# Patient Record
Sex: FEMALE | Race: BLACK OR AFRICAN AMERICAN | Employment: FULL TIME | URBAN - METROPOLITAN AREA
[De-identification: names, ages, dates, MRNs, and addresses within clinical notes are randomized per-mention and may not be internally consistent; named-entity substitution may affect disease eponyms.]

---

## 2017-10-18 ENCOUNTER — HOSPITAL ENCOUNTER (OUTPATIENT)
Dept: LAB | Age: 42
Discharge: HOME | End: 2017-10-18

## 2019-02-21 ENCOUNTER — OFFICE VISIT (OUTPATIENT)
Dept: FAMILY MEDICINE CLINIC | Age: 44
End: 2019-02-21

## 2019-02-21 VITALS
HEART RATE: 83 BPM | RESPIRATION RATE: 18 BRPM | OXYGEN SATURATION: 99 % | TEMPERATURE: 98.5 F | BODY MASS INDEX: 35.2 KG/M2 | DIASTOLIC BLOOD PRESSURE: 96 MMHG | SYSTOLIC BLOOD PRESSURE: 160 MMHG | HEIGHT: 64 IN | WEIGHT: 206.2 LBS

## 2019-02-21 DIAGNOSIS — R63.5 WEIGHT GAIN: ICD-10-CM

## 2019-02-21 DIAGNOSIS — Z23 ENCOUNTER FOR IMMUNIZATION: Primary | ICD-10-CM

## 2019-02-21 DIAGNOSIS — E66.01 SEVERE OBESITY (HCC): ICD-10-CM

## 2019-02-21 DIAGNOSIS — R79.89 ABNORMAL THYROID BLOOD TEST: ICD-10-CM

## 2019-02-21 DIAGNOSIS — Z13.220 LIPID SCREENING: ICD-10-CM

## 2019-02-21 DIAGNOSIS — Z00.00 ENCOUNTER FOR PREVENTIVE CARE: ICD-10-CM

## 2019-02-21 PROBLEM — E55.9 VITAMIN D INSUFFICIENCY: Status: ACTIVE | Noted: 2019-02-21

## 2019-02-21 RX ORDER — MELOXICAM 15 MG/1
TABLET ORAL
Refills: 2 | COMMUNITY
Start: 2019-01-17 | End: 2020-01-07

## 2019-02-21 NOTE — PROGRESS NOTES
[address] [place], [address] 
[phone] Date of visit:  2/21/19 Subjective:  
  
History obtained from:  the patient. Alem Chen is a 41 y.o. female who presents today for new patient to me, used to see Dr. Prince Mention here but has been almost 3 years since she had a physical or labs Mostly concerned with getting healthier, losing weight Does praise dance on Tuesday and Saturdays Planning to get back in the gym as she has gained some weight, plans to walk on treadmill and some lifting, maybe back in the pool Plans to go the [facility] Has always been active Wears a fitbit and gets 10k steps every day at least 
 
Says her eating habits have been bad recently Has gotten into eating a lot of fast food because she is on the go Like getting Florrie Goo or chikfila in the AM 
Previously would do 2 boiled eggs and a piece of toast 
 
Normally brings her lunch, whatever she cooked the night before Has been drinking soda, some juice, going to cut back slowly Sometimes coffee but only likes it with lots of sugar Does drink water too Likes to eat salad, salmon, spaghetti (tried brown noodles the other night) Sometimes will bhatia chicken, lately baking it Not a lot of beef, usually turkey if wanting ground meat Normally chicken or fish Has broccoli, spring beans, corn, collards Trying to cut back sodium as well, bought Mrs. Cardoso Man recently Works as special  Also works at [employer] on weekends Busy taking her 3 teenagers to sports Had a tetanus shot for an injury last year, cut her finger Willing to get flu shot today Youngest son has the flu now Having some hot/cold feelings at night Still having monthly cycles Itchiness in mid-chest sometimes when she praise dances but no cough or sob or pain, just a deep itchiness Patient Active Problem List  
 Diagnosis Date Noted  Vitamin D insufficiency 02/21/2019 Current Outpatient Medications Medication Sig Dispense Refill  meloxicam (MOBIC) 15 mg tablet TAKE 1 TABLET BY MOUTH EVERY DAY  2 No Known Allergies Past Medical History:  
Diagnosis Date  Abnormal Pap smear of cervix 2013 No past surgical history on file. Family History Problem Relation Age of Onset  Hypertension Mother  HIV/AIDS Mother  Hypertension Maternal Aunt  Hypertension Paternal Aunt  Cancer Paternal Aunt  Cancer Paternal Uncle  Kidney Disease Maternal Grandmother  Hypertension Father Social History Tobacco Use  Smoking status: Never Smoker  Smokeless tobacco: Never Used Substance Use Topics  Alcohol use: No  
  
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 Romain's Review of Systems Card: denies chest pain or palpitations Pulm: denies shortness of breath 3 most recent PHQ Screens 2/21/2019 Little interest or pleasure in doing things Not at all Feeling down, depressed, irritable, or hopeless Not at all Total Score PHQ 2 0 Objective:  
 
Vitals:  
 02/21/19 1558 BP: (!) 160/96 Pulse: 83 Resp: 18 Temp: 98.5 °F (36.9 °C) TempSrc: Oral  
SpO2: 99% Weight: 206 lb 3.2 oz (93.5 kg) Height: 5' 4\" (1.626 m) Body mass index is 35.39 kg/m². General: stated age,obese, and in NAD Eyes: PERRL, EOMI, no redness or drainage Nose: no drainage Mouth: no lesions Throat: no erythema, exudate or swelling Neck: supple, symmetrical, trachea midline, no adenopathy and thyroid: not enlarged, symmetric, no tenderness/mass/nodules Lungs:  clear to auscultation w/o rales, rhonchi, wheezes w/normal effort and no use of accessory muscles of respiration Heart: regular rate and rhythm, S1, S2 normal, no murmur, click, rub or gallop Abdomen: soft, nontender, no masses Ext:  No edema noted. Lymph: no cervical adenopathy appreciated Skin:  Normal. and no rash or abnormalities Neuro: normal gait, CN 2-12 intact Psych: alert and oriented to person, place, time and situation and Speech: appropriate quality, quantity and organization of sentences and normal affect Assessment/Plan: ICD-10-CM ICD-9-CM 1. Encounter for immunization Z23 V03.89 LA IMMUNIZ ADMIN,1 SINGLE/COMB VAC/TOXOID INFLUENZA VIRUS VAC QUAD,SPLIT,PRESV FREE SYRINGE IM 2. Encounter for preventive care Z00.00 V70.0 CBC WITH AUTOMATED DIFF  
   METABOLIC PANEL, COMPREHENSIVE  
   HEMOGLOBIN A1C WITH EAG  
   LIPID PANEL  
   T4, FREE  
   TSH 3RD GENERATION 3. Abnormal thyroid blood test R79.89 790.6 T4, FREE  
   TSH 3RD GENERATION 4. Weight gain R63.5 783.1 CBC WITH AUTOMATED DIFF  
   METABOLIC PANEL, COMPREHENSIVE T4, FREE  
   TSH 3RD GENERATION 5. Lipid screening Z13.220 V77.91 LIPID PANEL 6. Severe obesity (HCC) E66.01 278.01 HEMOGLOBIN A1C WITH EAG Orders Placed This Encounter  Influenza virus vaccine (QUADRIVALENT PRES FREE SYRINGE) IM ([id number])  CBC WITH AUTOMATED DIFF  
 METABOLIC PANEL, COMPREHENSIVE  
 HEMOGLOBIN A1C WITH EAG  
 LIPID PANEL  
 T4, FREE  
 TSH 3RD GENERATION  
 CVD REPORT  CKD REPORT  
 LA IMMUNIZ ADMIN,1 SINGLE/COMB VAC/TOXOID  meloxicam (MOBIC) 15 mg tablet Mostly discussed lifestyle, has a good plan for diet/exercise I think will work for her Routine labs Flu shot; other preventive care up to date (I think she said she has gyn for pap but need to clarify with her) No history of HTN but high today; she will monitor at home and update me in 1-2 weeks. Discussed that the lower salt diet and execise should help that Patient Instructions I think you have a great plan for your healthy eating and exercise The breakfast and soda are the main things to change Continue to eat a lot of veggies and drink a lot of water Try claritin about 1 hour before praise dancing and see if it helps the itching Your blood pressure should be less than 140/90 consistently (more than 90% of the time). Check it after sitting and resting for 10 minutes for an accurate result. Please call me if it is often running high. Try to check it daily and update me in 1-2 weeks Discussed the diagnosis and plan and she expressed understanding. Follow-up Disposition: 
Return in about 1 year (around 2/21/2020).  
 
Baldemar Roa MD

## 2019-02-21 NOTE — PATIENT INSTRUCTIONS
I think you have a great plan for your healthy eating and exercise The breakfast and soda are the main things to change Continue to eat a lot of veggies and drink a lot of water Try claritin about 1 hour before praise dancing and see if it helps the itching Your blood pressure should be less than 140/90 consistently (more than 90% of the time). Check it after sitting and resting for 10 minutes for an accurate result. Please call me if it is often running high. Try to check it daily and update me in 1-2 weeks Well Visit, Ages 25 to 48: Care Instructions Your Care Instructions Physical exams can help you stay healthy. Your doctor has checked your overall health and may have suggested ways to take good care of yourself. He or she also may have recommended tests. At home, you can help prevent illness with healthy eating, regular exercise, and other steps. Follow-up care is a key part of your treatment and safety. Be sure to make and go to all appointments, and call your doctor if you are having problems. It's also a good idea to know your test results and keep a list of the medicines you take. How can you care for yourself at home? · Reach and stay at a healthy weight. This will lower your risk for many problems, such as obesity, diabetes, heart disease, and high blood pressure. · Get at least 30 minutes of physical activity on most days of the week. Walking is a good choice. You also may want to do other activities, such as running, swimming, cycling, or playing tennis or team sports. Discuss any changes in your exercise program with your doctor. · Do not smoke or allow others to smoke around you. If you need help quitting, talk to your doctor about stop-smoking programs and medicines. These can increase your chances of quitting for good. · Talk to your doctor about whether you have any risk factors for sexually transmitted infections (STIs).  Having one sex partner (who does not have STIs and does not have sex with anyone else) is a good way to avoid these infections. · Use birth control if you do not want to have children at this time. Talk with your doctor about the choices available and what might be best for you. · Protect your skin from too much sun. When you're outdoors from 10 a.m. to 4 p.m., stay in the shade or cover up with clothing and a hat with a wide brim. Wear sunglasses that block UV rays. Even when it's cloudy, put broad-spectrum sunscreen (SPF 30 or higher) on any exposed skin. · See a dentist one or two times a year for checkups and to have your teeth cleaned. · Wear a seat belt in the car. · Drink alcohol in moderation, if at all. That means no more than 2 drinks a day for men and 1 drink a day for women. Follow your doctor's advice about when to have certain tests. These tests can spot problems early. For everyone · Cholesterol. Have the fat (cholesterol) in your blood tested after age 21. Your doctor will tell you how often to have this done based on your age, family history, or other things that can increase your risk for heart disease. · Blood pressure. Have your blood pressure checked during a routine doctor visit. Your doctor will tell you how often to check your blood pressure based on your age, your blood pressure results, and other factors. · Vision. Talk with your doctor about how often to have a glaucoma test. 
· Diabetes. Ask your doctor whether you should have tests for diabetes. · Colon cancer. Have a test for colon cancer at age 48. You may have one of several tests. If you are younger than 48, you may need a test earlier if you have any risk factors. Risk factors include whether you already had a precancerous polyp removed from your colon or whether your parent, brother, sister, or child has had colon cancer. For women · Breast exam and mammogram. Talk to your doctor about when you should have a clinical breast exam and a mammogram. Medical experts differ on whether and how often women under 50 should have these tests. Your doctor can help you decide what is right for you. · Pap test and pelvic exam. Begin Pap tests at age 24. A Pap test is the best way to find cervical cancer. The test often is part of a pelvic exam. Ask how often to have this test. 
· Tests for sexually transmitted infections (STIs). Ask whether you should have tests for STIs. You may be at risk if you have sex with more than one person, especially if your partners do not wear condoms. For men · Tests for sexually transmitted infections (STIs). Ask whether you should have tests for STIs. You may be at risk if you have sex with more than one person, especially if you do not wear a condom. · Testicular cancer exam. Ask your doctor whether you should check your testicles regularly. · Prostate exam. Talk to your doctor about whether you should have a blood test (called a PSA test) for prostate cancer. Experts differ on whether and when men should have this test. Some experts suggest it if you are older than 39 and are -American or have a father or brother who got prostate cancer when he was younger than 72. When should you call for help? Watch closely for changes in your health, and be sure to contact your doctor if you have any problems or symptoms that concern you. Where can you learn more? Go to http://sandor-raghu.info/. Enter P072 in the search box to learn more about \"Well Visit, Ages 25 to 48: Care Instructions. \" Current as of: March 28, 2018 Content Version: 11.9 © 1952-3855 [employer]wise, Incorporated. Care instructions adapted under license by [employer] (which disclaims liability or warranty for this information).  If you have questions about a medical condition or this instruction, always ask your healthcare professional. [employer] disclaims any warranty or liability for your use of this information.

## 2019-02-21 NOTE — PROGRESS NOTES
Chief Complaint Patient presents with  New Patient  
  establish care former stuart patient 1. Have you been to the ER, urgent care clinic since your last visit? Hospitalized since your last visit? No 
 
2. Have you seen or consulted any other health care providers outside of the [address] [place] [address] since your last visit? Include any pap smears or colon screening Breast surgery [facility]. 2 years ago, uterine surgery [facility]. 1 year at [facility] Patient has had pap within a year Patient sees PT

## 2019-02-22 PROBLEM — E66.01 SEVERE OBESITY (HCC): Status: ACTIVE | Noted: 2019-02-22

## 2019-02-22 PROBLEM — R79.89 ELEVATED SERUM CREATININE: Status: ACTIVE | Noted: 2019-02-22

## 2019-02-22 LAB
ALBUMIN SERPL-MCNC: 3.8 G/DL (ref 3.5–5.5)
ALBUMIN/GLOB SERPL: 1.3 {RATIO} (ref 1.2–2.2)
ALP SERPL-CCNC: 107 IU/L (ref 39–117)
ALT SERPL-CCNC: 13 IU/L (ref 0–32)
AST SERPL-CCNC: 17 IU/L (ref 0–40)
BASOPHILS # BLD AUTO: 0.1 X10E3/UL (ref 0–0.2)
BASOPHILS NFR BLD AUTO: 1 %
BILIRUB SERPL-MCNC: <0.2 MG/DL (ref 0–1.2)
BUN SERPL-MCNC: 15 MG/DL (ref 6–24)
BUN/CREAT SERPL: 12 (ref 9–23)
CALCIUM SERPL-MCNC: 9.1 MG/DL (ref 8.7–10.2)
CHLORIDE SERPL-SCNC: 105 MMOL/L (ref 96–106)
CHOLEST SERPL-MCNC: 194 MG/DL (ref 100–199)
CO2 SERPL-SCNC: 26 MMOL/L (ref 20–29)
CREAT SERPL-MCNC: 1.21 MG/DL (ref 0.57–1)
EOSINOPHIL # BLD AUTO: 0.4 X10E3/UL (ref 0–0.4)
EOSINOPHIL NFR BLD AUTO: 3 %
ERYTHROCYTE [DISTWIDTH] IN BLOOD BY AUTOMATED COUNT: 14.4 % (ref 12.3–15.4)
EST. AVERAGE GLUCOSE BLD GHB EST-MCNC: 114 MG/DL
GLOBULIN SER CALC-MCNC: 2.9 G/DL (ref 1.5–4.5)
GLUCOSE SERPL-MCNC: 79 MG/DL (ref 65–99)
HBA1C MFR BLD: 5.6 % (ref 4.8–5.6)
HCT VFR BLD AUTO: 39.2 % (ref 34–46.6)
HDLC SERPL-MCNC: 44 MG/DL
HGB BLD-MCNC: 12.6 G/DL (ref 11.1–15.9)
IMM GRANULOCYTES # BLD AUTO: 0 X10E3/UL (ref 0–0.1)
IMM GRANULOCYTES NFR BLD AUTO: 0 %
INTERPRETATION, 910389: NORMAL
INTERPRETATION: NORMAL
LDLC SERPL CALC-MCNC: 125 MG/DL (ref 0–99)
LYMPHOCYTES # BLD AUTO: 2.9 X10E3/UL (ref 0.7–3.1)
LYMPHOCYTES NFR BLD AUTO: 26 %
MCH RBC QN AUTO: 27.6 PG (ref 26.6–33)
MCHC RBC AUTO-ENTMCNC: 32.1 G/DL (ref 31.5–35.7)
MCV RBC AUTO: 86 FL (ref 79–97)
MONOCYTES # BLD AUTO: 1 X10E3/UL (ref 0.1–0.9)
MONOCYTES NFR BLD AUTO: 9 %
NEUTROPHILS # BLD AUTO: 7.1 X10E3/UL (ref 1.4–7)
NEUTROPHILS NFR BLD AUTO: 61 %
PDF IMAGE, 910387: NORMAL
PLATELET # BLD AUTO: 305 X10E3/UL (ref 150–379)
POTASSIUM SERPL-SCNC: 4.3 MMOL/L (ref 3.5–5.2)
PROT SERPL-MCNC: 6.7 G/DL (ref 6–8.5)
RBC # BLD AUTO: 4.56 X10E6/UL (ref 3.77–5.28)
SODIUM SERPL-SCNC: 142 MMOL/L (ref 134–144)
T4 FREE SERPL-MCNC: 1.04 NG/DL (ref 0.82–1.77)
TRIGL SERPL-MCNC: 126 MG/DL (ref 0–149)
TSH SERPL DL<=0.005 MIU/L-ACNC: 1.08 UIU/ML (ref 0.45–4.5)
VLDLC SERPL CALC-MCNC: 25 MG/DL (ref 5–40)
WBC # BLD AUTO: 11.5 X10E3/UL (ref 3.4–10.8)

## 2019-02-22 NOTE — PROGRESS NOTES
Sent in letter: 
Thyroid tests were normal this time. Blood counts, sugar, liver, and electrolytes were fine. Cholesterol was just slightly high but ok for your age. Your diet plan should help that anyway. Kidney test (creatinine) was just slightly elevated. It is probably nothing, but I do want to make sure we are protecting your kidneys. The meloxicam you have, in addition to other nsaids (like aleve, ibuprofen, or aspirin) can hurt your kidneys. Please let me know if you are taking any of those on a regular basis. More than 2x per month is probably not good. Tylenol would be a very safe substitute. Also, I am not sure if you have a gyn doctor? Let me know if you need me to do your pap. If you do have a gyn right now, could you ask them to send me a record of your most recent visit?    
 
It was great to meet you, and I look forward to seeing your progress!!

## 2019-11-25 ENCOUNTER — OFFICE VISIT (OUTPATIENT)
Dept: FAMILY MEDICINE CLINIC | Age: 44
End: 2019-11-25

## 2019-11-25 VITALS
TEMPERATURE: 99.3 F | OXYGEN SATURATION: 99 % | RESPIRATION RATE: 18 BRPM | SYSTOLIC BLOOD PRESSURE: 150 MMHG | WEIGHT: 212 LBS | HEIGHT: 64 IN | DIASTOLIC BLOOD PRESSURE: 100 MMHG | HEART RATE: 88 BPM | BODY MASS INDEX: 36.19 KG/M2

## 2019-11-25 DIAGNOSIS — J06.9 UPPER RESPIRATORY TRACT INFECTION, UNSPECIFIED TYPE: Primary | ICD-10-CM

## 2019-11-25 DIAGNOSIS — R03.0 ELEVATED BLOOD PRESSURE READING: ICD-10-CM

## 2019-11-25 RX ORDER — AZITHROMYCIN 250 MG/1
TABLET, FILM COATED ORAL
Qty: 6 TAB | Refills: 0 | Status: SHIPPED | OUTPATIENT
Start: 2019-11-25 | End: 2019-11-30

## 2019-11-25 RX ORDER — NORETHINDRONE ACETATE AND ETHINYL ESTRADIOL AND FERROUS FUMARATE 1MG-20(21)
KIT ORAL
Refills: 5 | COMMUNITY
Start: 2019-11-07 | End: 2020-06-16

## 2019-11-25 NOTE — PROGRESS NOTES
HISTORY OF PRESENT ILLNESS  Danae Marshall is a 45 y.o. female. HPI  C/o cough, chest congestion, malaise x 1 week. No fever/chills. Taking coricidin with little sx relief. BP noted to be elevated today in office. Patient reports it is up and down. She has CPE scheduled with PCP in 8 weeks. Past medical history, social history, family history and medications were reviewed and updated. Blood pressure (!) 150/100, pulse 88, temperature 99.3 °F (37.4 °C), temperature source Oral, resp. rate 18, height 5' 4\" (1.626 m), weight 212 lb (96.2 kg), last menstrual period 11/18/2019, SpO2 99 %. Review of Systems   Constitutional: Positive for malaise/fatigue. Negative for chills and fever. HENT: Negative for congestion, sinus pain and sore throat. Respiratory: Positive for cough. Negative for sputum production, shortness of breath and wheezing. Cardiovascular: Negative for chest pain. Neurological: Negative for dizziness and headaches. All other systems reviewed and are negative. Physical Exam  Constitutional:       General: She is not in acute distress. HENT:      Right Ear: Tympanic membrane and ear canal normal.      Left Ear: Tympanic membrane and ear canal normal.      Nose: Congestion present. Mouth/Throat:      Pharynx: Posterior oropharyngeal erythema present. No oropharyngeal exudate. Neck:      Musculoskeletal: Neck supple. Cardiovascular:      Rate and Rhythm: Normal rate and regular rhythm. Heart sounds: Normal heart sounds. Pulmonary:      Effort: Pulmonary effort is normal.      Breath sounds: Normal breath sounds. No wheezing. Chest:      Chest wall: No tenderness. Lymphadenopathy:      Cervical: No cervical adenopathy. Skin:     General: Skin is warm and dry. ASSESSMENT and PLAN  Diagnoses and all orders for this visit:    1. Upper respiratory tract infection, unspecified type  -     azithromycin (ZITHROMAX) 250 mg tablet;  Take 2 tablets today, then take 1 tablet daily  Rest and fluids. Mucinex DM bid. May take nyquil cough at bedtime. Begin z pack as directed. Medication risks, benefit and potential side effects were reviewed. 2. Elevated blood pressure reading  Recommend interim BP monitoring and record. Follow up 2 weeks to recheck.

## 2019-11-25 NOTE — LETTER
NOTIFICATION RETURN TO WORK / SCHOOL 
 
11/25/2019 6:56 PM 
 
[facility] [address] [facility] [phone] To Whom It May Concern: 
 
Elena Kim is currently under the care of LEYDI Gilbert. She will return to work/school on: 12/2/19. If there are questions or concerns please have the patient contact our office.  
 
 
 
Sincerely, 
 
 
Saloni Bowden NP

## 2019-11-25 NOTE — PROGRESS NOTES
Chief Complaint   Patient presents with    Nasal Congestion     x 1 week    Cough     x 1 week productive cough     Sore Throat     x 1 week     Fatigue     x 1 week with headache      1. Have you been to the ER, urgent care clinic since your last visit? Hospitalized since your last visit? No    2. Have you seen or consulted any other health care providers outside of the [address] [place] [address] since your last visit? Include any pap smears or colon screening.  No

## 2019-11-26 ENCOUNTER — TELEPHONE (OUTPATIENT)
Dept: FAMILY MEDICINE CLINIC | Age: 44
End: 2019-11-26

## 2019-11-26 DIAGNOSIS — I10 ESSENTIAL HYPERTENSION: Primary | ICD-10-CM

## 2019-11-26 RX ORDER — LOSARTAN POTASSIUM 50 MG/1
50 TABLET ORAL DAILY
Qty: 30 TAB | Refills: 0 | Status: SHIPPED | OUTPATIENT
Start: 2019-11-26 | End: 2019-12-18

## 2019-11-26 NOTE — TELEPHONE ENCOUNTER
Pt is calling to let Ahmet know that her BP is still very high  Its 164/101a few minutes ago. She doesn't want to come for an appointment , states that [facility] asked her to call if her BP was still high  She doesn't have any symptoms. Had slight headache but she took Asprin.     Requesting call back BCB# [phone]  LOV : November 25, 2019 06:15 PM

## 2019-11-26 NOTE — TELEPHONE ENCOUNTER
Jose Singleton, ONELIA Velazquez, LPN   Caller: Unspecified (Today,  3:36 PM)             I will send rx for medication to pharmacy. Paul Brown in 2 weeks to recheck.       Left message that rx has been sent in and needs to schedule appt in 2 weeks

## 2019-12-18 DIAGNOSIS — I10 ESSENTIAL HYPERTENSION: ICD-10-CM

## 2019-12-18 RX ORDER — LOSARTAN POTASSIUM 50 MG/1
TABLET ORAL
Qty: 30 TAB | Refills: 0 | Status: SHIPPED | OUTPATIENT
Start: 2019-12-18 | End: 2020-01-07 | Stop reason: SDUPTHER

## 2020-01-06 PROBLEM — I10 ESSENTIAL HYPERTENSION: Status: ACTIVE | Noted: 2020-01-06

## 2020-01-06 PROBLEM — E78.00 PURE HYPERCHOLESTEROLEMIA: Status: ACTIVE | Noted: 2020-01-06

## 2020-01-06 NOTE — PROGRESS NOTES
Lucas Hoyt [facility]  [place]. [place], [address]  [phone]             Date of visit:  1/7/2020      Subjective:      History obtained from:  the patient. Lilly Ornelas is a 45 y.o. female who presents today for physical    Feeling well overall  Had gotten sick in November, her bp was high  Ended up taking antibiotics for bronchitis  bp continued to stay high  Called back in and put her on the losartan and has come down  Blood pressure on losartan mostly controlled at home and school, like 120s    Has gyn, last pap was Nov and was ok  On OCPs for headaches since November  Has had tubes tied so doesn't   Need it for pregnancy prevention    Has been drinking more water  Also avoiding sodium foods  Has lost a little weight    Does praise dance on Tuesday and Saturdays still  Planning to get back in the [facility] as she has gained some weight, plans to walk on treadmill  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 Was also drinking some soda and juice but mostly stopped that.     Flu shot not yet, we are out of it so will get it at pharmacy    Every once in a while will have upper central chest pain for about 2 days straight  Thinks it is related to sleeping funny  Also thinks maybe related to something she eats or due to needing to drink more water  Maybe worse when she lies down, depends on the position she lays in  Doesn't make her short of breath  Does feel worse if she pushes on it, or if she moves a certain way  Might happen once per month    Did mamm[facility] at [facility] in October      Patient Active Problem List    Diagnosis Date Noted    Essential hypertension 01/06/2020    Pure hypercholesterolemia 01/06/2020    Elevated serum creatinine 02/22/2019    Severe obesity ([address] 02/22/2019    Vitamin D insufficiency 02/21/2019 Current Outpatient Medications   Medication Sig Dispense Refill    losartan (COZAAR) 50 mg tablet TAKE 1 TABLET BY MOUTH EVERY DAY 90 Tab 1    JUNEL FE 1/20, 28, 1 mg-20 mcg (21)/75 mg (7) tab TAKE 1 TABLET BY MOUTH EVERY DAY  5     No Known Allergies  Past Medical History:   Diagnosis Date    Abnormal Pap smear of cervix 2013     History reviewed. No pertinent surgical history. Family History   Problem Relation Age of Onset    Hypertension Mother     HIV/AIDS Mother     Hypertension Maternal Aunt     Hypertension Paternal Aunt     Cancer Paternal Aunt     Cancer Paternal Uncle     Kidney Disease Maternal Grandmother     Hypertension Father      Social History     Tobacco Use    Smoking status: Never Smoker    Smokeless tobacco: Never Used   Substance Use Topics    Alcohol use: No      Social History     Patient does not qualify to have social determinant information on file (likely too young). 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 , also works at [employer] with her [employer], [address] taking her 3 teenagers to sports         Review of Systems  Pulm: denies sob  3 most recent [address] 1/7/2020   [address] interest or pleasure in doing things Not at all   Feeling down, depressed, irritable, or hopeless Not at all   Total Score PHQ 2 0       Objective:     Vitals:    01/07/20 0843 01/07/20 0924   BP: (!) 145/96 130/78   Pulse: 77    Resp: 18    Temp: 98.2 °F (36.8 °C)    TempSrc: Oral    SpO2: 100%    Weight: 206 lb 3.2 oz (93.5 kg)    Height: 5' 4\" (1.626 m)      Body mass index is 35.39 kg/m².      General: stated age,obese, and in NAD  Eyes: PERRL, EOMI, no redness or drainage  Nose: no drainage  Mouth: no lesions  Throat: no erythema, exudate or swelling  Neck: supple, symmetrical, trachea midline, no adenopathy and thyroid: not enlarged, symmetric, no tenderness/mass/nodules  Lungs:  clear to auscultation w/o rales, rhonchi, wheezes w/normal effort and no use of accessory muscles of respiration   Heart: regular rate and rhythm, S1, S2 normal, no murmur, click, rub or gallop  Abdomen: soft, nontender, no masses  Ext:  No edema noted. Lymph: no cervical adenopathy appreciated  Skin:  Normal. and no rash or abnormalities   Neuro: normal gait, CN 2-12 intact  Psych: alert and oriented to person, place, time and situation and Speech: appropriate quality, quantity and organization of sentences         Assessment/Plan:       ICD-10-CM ICD-9-CM    1. Encounter for preventive care Z00.00 V70.0 CBC WITH AUTOMATED DIFF      METABOLIC PANEL, COMPREHENSIVE      LIPID PANEL      VITAMIN D, 25 HYDROXY      HEPATITIS C AB      HIV 1/2 AG/AB, 4TH GENERATION,W RFLX CONFIRM   2. Essential hypertension I10 401.9 CBC WITH AUTOMATED DIFF      METABOLIC PANEL, COMPREHENSIVE      LIPID PANEL      losartan (COZAAR) 50 mg tablet   3. Pure hypercholesterolemia E78.00 272.0    4. Vitamin D insufficiency E55.9 268.9 VITAMIN D, 25 HYDROXY   5. Elevated serum creatinine R79.89 790.99    6. Severe obesity ([address] E66.01 278.01    7. Encounter for immunization Z23 V03.89    8. Encounter for screening for HIV Z11.4 V73.89 HIV 1/2 AG/AB, 4TH GENERATION,W RFLX CONFIRM   9. Encounter for hepatitis C screening test for low risk patient Z11.59 V73.89 HEPATITIS C AB   10. Chest wall pain R07.89 786.52         Orders Placed This Encounter    CBC WITH AUTOMATED DIFF    METABOLIC PANEL, COMPREHENSIVE    LIPID PANEL    VITAMIN D, 25 HYDROXY    HEPATITIS C AB    HIV 1/2 AG/AB, 4TH GENERATION,W RFLX CONFIRM    losartan (COZAAR) 50 mg tablet       Doing well with lifestyle  Encouraged even more exercise  Discussed diet, avoiding sugary drinks, fast food  She has made good improvements and is losing weight  Tolerating losratan and working well  Routine labs    Discussed the diagnosis and plan and she expressed understanding.     Follow-up and Dispositions    · Return in about 6 months (around 7/7/2020) for Follow up blood pressure.          Demetrius Hernandez MD

## 2020-01-06 NOTE — PATIENT INSTRUCTIONS
Well Visit, Ages 25 to 48: Care Instructions  Your Care Instructions    Physical exams can help you stay healthy. Your doctor has checked your overall health and may have suggested ways to take good care of yourself. He or she also may have recommended tests. At home, you can help prevent illness with healthy eating, regular exercise, and other steps. Follow-up care is a key part of your treatment and safety. Be sure to make and go to all appointments, and call your doctor if you are having problems. It's also a good idea to know your test results and keep a list of the medicines you take. How can you care for yourself at home? · Reach and stay at a healthy weight. This will lower your risk for many problems, such as obesity, diabetes, heart disease, and high blood pressure. · Get at least 30 minutes of physical activity on most days of the week. Walking is a good choice. You also may want to do other activities, such as running, swimming, cycling, or playing tennis or team sports. Discuss any changes in your exercise program with your doctor. · Do not smoke or allow others to smoke around you. If you need help quitting, talk to your doctor about stop-smoking programs and medicines. These can increase your chances of quitting for good. · Talk to your doctor about whether you have any risk factors for sexually transmitted infections (STIs). Having one sex partner (who does not have STIs and does not have sex with anyone else) is a good way to avoid these infections. · Use birth control if you do not want to have children at this time. Talk with your doctor about the choices available and what might be best for you. · Protect your skin from too much sun. When you're outdoors from 10 a.m. to 4 p.m., stay in the shade or cover up with clothing and a hat with a wide brim. Wear sunglasses that block UV rays. Even when it's cloudy, put broad-spectrum sunscreen (SPF 30 or higher) on any exposed skin.   · See a dentist one or two times a year for checkups and to have your teeth cleaned. · Wear a seat belt in the car. Follow your doctor's advice about when to have certain tests. These tests can spot problems early. For everyone  · Cholesterol. Have the fat (cholesterol) in your blood tested after age 21. Your doctor will tell you how often to have this done based on your age, family history, or other things that can increase your risk for heart disease. · Blood pressure. Have your blood pressure checked during a routine doctor visit. Your doctor will tell you how often to check your blood pressure based on your age, your blood pressure results, and other factors. · Vision. Talk with your doctor about how often to have a glaucoma test.  · Diabetes. Ask your doctor whether you should have tests for diabetes. · Colon cancer. Your risk for colorectal cancer gets higher as you get older. Some experts say that adults should start regular screening at age 48 and stop at age 76. Others say to start before age 48 or continue after age 76. Talk with your doctor about your risk and when to start and stop screening. For women  · Breast exam and mammogram. Talk to your doctor about when you should have a clinical breast exam and a mammogram. Medical experts differ on whether and how often women under 50 should have these tests. Your doctor can help you decide what is right for you. · Cervical cancer screening test and pelvic exam. Begin with a Pap test at age 24. The test often is part of a pelvic exam. Starting at age 27, you may choose to have a Pap test, an HPV test, or both tests at the same time (called co-testing). Talk with your doctor about how often to have testing. · Tests for sexually transmitted infections (STIs). Ask whether you should have tests for STIs. You may be at risk if you have sex with more than one person, especially if your partners do not wear condoms.   For men  · Tests for sexually transmitted infections (STIs). Ask whether you should have tests for STIs. You may be at risk if you have sex with more than one person, especially if you do not wear a condom. · Testicular cancer exam. Ask your doctor whether you should check your testicles regularly. · Prostate exam. Talk to your doctor about whether you should have a blood test (called a PSA test) for prostate cancer. Experts differ on whether and when men should have this test. Some experts suggest it if you are older than 39 and are -American or have a father or brother who got prostate cancer when he was younger than 72. When should you call for help? Watch closely for changes in your health, and be sure to contact your doctor if you have any problems or symptoms that concern you. Where can you learn more? Go to http://sandor-raghu.info/. Enter P072 in the search box to learn more about \"Well Visit, Ages 25 to 48: Care Instructions. \"  Current as of: December 13, 2018  Content Version: 12.2  © 9811-7022 [employer]. Care instructions adapted under license by [employer] (which disclaims liability or warranty for this information). If you have questions about a medical condition or this instruction, always ask your healthcare professional. [place] [address] any warranty or liability for your use of this information.

## 2020-01-07 ENCOUNTER — OFFICE VISIT (OUTPATIENT)
Dept: FAMILY MEDICINE CLINIC | Age: 45
End: 2020-01-07

## 2020-01-07 VITALS
TEMPERATURE: 98.2 F | OXYGEN SATURATION: 100 % | HEIGHT: 64 IN | SYSTOLIC BLOOD PRESSURE: 130 MMHG | HEART RATE: 77 BPM | RESPIRATION RATE: 18 BRPM | DIASTOLIC BLOOD PRESSURE: 78 MMHG | WEIGHT: 206.2 LBS | BODY MASS INDEX: 35.2 KG/M2

## 2020-01-07 DIAGNOSIS — R79.89 ELEVATED SERUM CREATININE: ICD-10-CM

## 2020-01-07 DIAGNOSIS — R07.89 CHEST WALL PAIN: ICD-10-CM

## 2020-01-07 DIAGNOSIS — I10 ESSENTIAL HYPERTENSION: ICD-10-CM

## 2020-01-07 DIAGNOSIS — E66.01 SEVERE OBESITY (HCC): ICD-10-CM

## 2020-01-07 DIAGNOSIS — E55.9 VITAMIN D INSUFFICIENCY: ICD-10-CM

## 2020-01-07 DIAGNOSIS — Z23 ENCOUNTER FOR IMMUNIZATION: ICD-10-CM

## 2020-01-07 DIAGNOSIS — Z11.59 ENCOUNTER FOR HEPATITIS C SCREENING TEST FOR LOW RISK PATIENT: ICD-10-CM

## 2020-01-07 DIAGNOSIS — Z00.00 ENCOUNTER FOR PREVENTIVE CARE: Primary | ICD-10-CM

## 2020-01-07 DIAGNOSIS — E78.00 PURE HYPERCHOLESTEROLEMIA: ICD-10-CM

## 2020-01-07 DIAGNOSIS — Z11.4 ENCOUNTER FOR SCREENING FOR HIV: ICD-10-CM

## 2020-01-07 RX ORDER — LOSARTAN POTASSIUM 50 MG/1
TABLET ORAL
Qty: 90 TAB | Refills: 1 | Status: SHIPPED | OUTPATIENT
Start: 2020-01-07 | End: 2020-06-16 | Stop reason: SDUPTHER

## 2020-01-07 NOTE — PROGRESS NOTES
Chief Complaint   Patient presents with    Complete Physical     fasting     Hypertension     follow up blood pressure      1. Have you been to the ER, urgent care clinic since your last visit? Hospitalized since your last visit? No    2. Have you seen or consulted any other health care providers outside of the [address] [place] [address] since your last visit? Include any pap smears or colon screening.  No    Had pap in 2019 with Dr. Padmini Hyman (records requested)

## 2020-01-07 NOTE — LETTER
NOTIFICATION RETURN TO WORK / SCHOOL 
 
1/7/2020 9:26 AM 
 
Ms. [address] [facility] [address] [facility] [address] [id number] To Whom It May Concern: 
 
Shelli Orosco is currently under the care of LEYDI Gilbert. She will return to work/school on: 1/7/2020 without restrictions. If there are questions or concerns please have the patient contact our office. Sincerely, Jose Walkre MD

## 2020-01-08 LAB
25(OH)D3+25(OH)D2 SERPL-MCNC: 19.3 NG/ML (ref 30–100)
ALBUMIN SERPL-MCNC: 3.7 G/DL (ref 3.5–5.5)
ALBUMIN/GLOB SERPL: 1.1 {RATIO} (ref 1.2–2.2)
ALP SERPL-CCNC: 81 IU/L (ref 39–117)
ALT SERPL-CCNC: 8 IU/L (ref 0–32)
AST SERPL-CCNC: 14 IU/L (ref 0–40)
BASOPHILS # BLD AUTO: 0 X10E3/UL (ref 0–0.2)
BASOPHILS NFR BLD AUTO: 0 %
BILIRUB SERPL-MCNC: 0.3 MG/DL (ref 0–1.2)
BUN SERPL-MCNC: 12 MG/DL (ref 6–24)
BUN/CREAT SERPL: 12 (ref 9–23)
CALCIUM SERPL-MCNC: 9.3 MG/DL (ref 8.7–10.2)
CHLORIDE SERPL-SCNC: 106 MMOL/L (ref 96–106)
CHOLEST SERPL-MCNC: 209 MG/DL (ref 100–199)
CO2 SERPL-SCNC: 21 MMOL/L (ref 20–29)
CREAT SERPL-MCNC: 1.02 MG/DL (ref 0.57–1)
EOSINOPHIL # BLD AUTO: 0.2 X10E3/UL (ref 0–0.4)
EOSINOPHIL NFR BLD AUTO: 2 %
ERYTHROCYTE [DISTWIDTH] IN BLOOD BY AUTOMATED COUNT: 13.2 % (ref 11.7–15.4)
GLOBULIN SER CALC-MCNC: 3.3 G/DL (ref 1.5–4.5)
GLUCOSE SERPL-MCNC: 92 MG/DL (ref 65–99)
HCT VFR BLD AUTO: 42.6 % (ref 34–46.6)
HCV AB S/CO SERPL IA: 0.1 S/CO RATIO (ref 0–0.9)
HDLC SERPL-MCNC: 33 MG/DL
HGB BLD-MCNC: 13.7 G/DL (ref 11.1–15.9)
HIV 1+2 AB+HIV1 P24 AG SERPL QL IA: NON REACTIVE
IMM GRANULOCYTES # BLD AUTO: 0 X10E3/UL (ref 0–0.1)
IMM GRANULOCYTES NFR BLD AUTO: 0 %
INTERPRETATION, 910389: NORMAL
LDLC SERPL CALC-MCNC: 156 MG/DL (ref 0–99)
LYMPHOCYTES # BLD AUTO: 2 X10E3/UL (ref 0.7–3.1)
LYMPHOCYTES NFR BLD AUTO: 21 %
MCH RBC QN AUTO: 27.7 PG (ref 26.6–33)
MCHC RBC AUTO-ENTMCNC: 32.2 G/DL (ref 31.5–35.7)
MCV RBC AUTO: 86 FL (ref 79–97)
MONOCYTES # BLD AUTO: 0.7 X10E3/UL (ref 0.1–0.9)
MONOCYTES NFR BLD AUTO: 7 %
NEUTROPHILS # BLD AUTO: 6.6 X10E3/UL (ref 1.4–7)
NEUTROPHILS NFR BLD AUTO: 70 %
PLATELET # BLD AUTO: 323 X10E3/UL (ref 150–450)
POTASSIUM SERPL-SCNC: 5.1 MMOL/L (ref 3.5–5.2)
PROT SERPL-MCNC: 7 G/DL (ref 6–8.5)
RBC # BLD AUTO: 4.94 X10E6/UL (ref 3.77–5.28)
SODIUM SERPL-SCNC: 141 MMOL/L (ref 134–144)
TRIGL SERPL-MCNC: 101 MG/DL (ref 0–149)
VLDLC SERPL CALC-MCNC: 20 MG/DL (ref 5–40)
WBC # BLD AUTO: 9.5 X10E3/UL (ref 3.4–10.8)

## 2020-01-08 RX ORDER — MELATONIN
1000 DAILY
COMMUNITY

## 2020-01-08 RX ORDER — ERGOCALCIFEROL 1.25 MG/1
50000 CAPSULE ORAL
Qty: 8 CAP | Refills: 0 | Status: SHIPPED | OUTPATIENT
Start: 2020-01-08 | End: 2020-02-27

## 2020-03-02 ENCOUNTER — TELEPHONE (OUTPATIENT)
Dept: FAMILY MEDICINE CLINIC | Age: 45
End: 2020-03-02

## 2020-03-02 NOTE — TELEPHONE ENCOUNTER
.Pharmacy is requesting for a new medication. VITAMIN D2 1.25MG(50,000 UNIT)       QTY: 8    SIG: TAKE 1 CAPSULE BY MOUTH EVERY 7 DAYS FOR 8 DOSES          . Pharmacy on file verified        LOV: Tuesday, January 07, 2020   UOV: Tuesday, June 16, 2020

## 2020-05-28 ENCOUNTER — OFFICE VISIT (OUTPATIENT)
Dept: PRIMARY CARE CLINIC | Age: 45
End: 2020-05-28

## 2020-05-28 DIAGNOSIS — Z20.822 EXPOSURE TO COVID-19 VIRUS: Primary | ICD-10-CM

## 2020-05-28 NOTE — PATIENT INSTRUCTIONS
9 Things To Do If You've Been Exposed to COVID-19    Stay home. If you've been exposed, you should stay in isolation for 14 days. Don't go to school, work, or public areas. And don't use public transportation, ride-shares, or taxis unless you have no choice. Leave your home only if you need to get medical care. But call the doctor's office first so they know you're coming, and wear a cloth face cover when you go. Call your doctor. Call your doctor or other health professional to let them know that you've been exposed. They might want you to be tested, or they may have other instructions for you. If you become sick, wear a face cover when you are around other people. It can help stop the spread of the virus when you cough or sneeze. Limit contact with people in your home. If possible, stay in a separate bedroom and use a separate bathroom. Avoid contact with pets and other animals. Cover your mouth and nose with a tissue when you cough or sneeze. Then throw it in the trash right away. Wash your hands often, especially after you cough or sneeze. Use soap and water, and scrub for at least 20 seconds. If soap and water aren't available, use an alcohol-based hand . Don't share personal household items. These include bedding, towels, cups and glasses, and eating utensils. Clean and disinfect your home every day. Use household  or disinfectant wipes or sprays. Take special care to clean things that you grab with your hands. These include doorknobs, remote controls, phones, and handles on your refrigerator and microwave. And don't forget countertops, tabletops, bathrooms, and computer keyboards. Current as of: May 8, 2020               Content Version: 12.5  © 2006-2020 [employer], Incorporated. Care instructions adapted under license by [employer] (which disclaims liability or warranty for this information).  If you have questions about a medical condition or this instruction, always ask your healthcare professional. [place] [address] any warranty or liability for your use of this information.

## 2020-05-28 NOTE — PROGRESS NOTES
[facility]  [address], [address]  [phone]             Date of visit: 5/28/2020   Subjective:      History obtained from:  the patient. Geri Rich is a 45 y.o. female who presents today for  just dx covid  He works in [employer] but doesn't have many symptoms, maybe mildly      Seen in our outdoor flu clinic during COVID-19 pandemic    She feels fine    See scanned paper form    Assessment/Plan:       ICD-10-CM ICD-9-CM    1. Exposure to COVID-19 virus Z20.828 V01.79 NOVEL CORONAVIRUS (COVID-19)      NOVEL CORONAVIRUS (COVID-19)        Orders Placed This Encounter    NOVEL CORONAVIRUS (COVID-19)       Will mychart with results    Discussed the diagnosis and plan and she expressed understanding. Follow-up and Dispositions    · Return if symptoms worsen or fail to improve.          Mark Cannon MD

## 2020-05-29 LAB — SARS-COV-2, NAA: NOT DETECTED

## 2020-06-15 NOTE — PROGRESS NOTES
[facility]  [place], [address]  [phone]             Date of visit:  6/16/2020      Subjective:      History obtained from:  the patient. Jerry Watkins is a 45 y.o. female who presents today for   Chief Complaint   Patient presents with    Follow-up     pt c/o late menstural period     Other     pt is concerned with high BP      This service was provided through telehealth (telephone only as doxy. me not working) due to COVID-19 pandemic, with the patient being at home and the provider being at [facility] in [place]. Others assisting in the telehealth encounter included none. 21 minutes were spent with the patient by the provider. she  and/or her healthcare decision maker is aware that this patient-initiated Telehealth encounter is a billable service, with coverage as determined by her insurance carrier. she  is aware that she  may receive a bill and has provided verbal consent to proceed: Yes, per PSR. 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 Didn't walk the past 2 weeks  Yesterday did a little exerise and bp went down to 132/101    Before the past 2 weeks wasn't checking bp because she wasn't feeling badly  Had a headache 2 days ago and thought it was due to bp    Weight down to 185; has lost about 20lb since Jan!  Drinking water instead of soda/juice   Trying to avoid fast food habit   Eats twice daily  Used to praise dance and go to [facility] for treadmill, recently walking for exercise  Low carb diet helped. Might go back to gym soon     had asymptomatic covid last month; she was tested and was negative  He is still positive and not able to work, getting tested weekly. This has been nerve-wracking for them.     Patient Active Problem List    Diagnosis Date Noted    Essential hypertension 01/06/2020    Pure hypercholesterolemia 01/06/2020    Elevated serum creatinine 02/22/2019    Severe obesity ([address] 02/22/2019    Vitamin D insufficiency 02/21/2019     Current Outpatient Medications   Medication Sig Dispense Refill    elderberry fruit (ELDERBERRY PO) Take  by mouth.  amLODIPine (NORVASC) 5 mg tablet Take 1 Tab by mouth daily. 90 Tab 1    losartan (COZAAR) 50 mg tablet TAKE 1 TABLET BY MOUTH EVERY DAY 90 Tab 1    cholecalciferol (VITAMIN D3) (1000 Units /25 mcg) tablet Take 1 Tab by mouth daily. No Known Allergies  Past Medical History:   Diagnosis Date    Abnormal Pap smear of cervix 2013     History reviewed. No pertinent surgical history. 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 There is no height or weight on file to calculate BMI. No exam done; telephone only visit  Assessment/Plan:       ICD-10-CM ICD-9-CM    1.  Essential hypertension Q11 235.8 METABOLIC PANEL, BASIC losartan (COZAAR) 50 mg tablet   2. Severe obesity ([address] E66.01 278.01    3. Pure hypercholesterolemia E78.00 272.0    4. Elevated serum creatinine R79.89 790.99         Orders Placed This Encounter    METABOLIC PANEL, BASIC    elderberry fruit (ELDERBERRY PO)    amLODIPine (NORVASC) 5 mg tablet    losartan (COZAAR) 50 mg tablet     bp not controlled  Has done great with weight loss; encouraged her to keep up the good work  Asked her to get lab soon  Will add amlodipine and update me on bp in 1 week  She will also cut back on sanabria    Discussed the diagnosis and plan and she expressed understanding.     Follow-up and Dispositions    · Return in about 6 months (around 12/16/2020) for Full Physical.         Cecilia Garcia MD

## 2020-06-15 NOTE — PATIENT INSTRUCTIONS
DASH Diet: Care Instructions Your Care Instructions The DASH diet is an eating plan that can help lower your blood pressure. DASH stands for Dietary Approaches to Stop Hypertension. Hypertension is high blood pressure. The DASH diet focuses on eating foods that are high in calcium, potassium, and magnesium. These nutrients can lower blood pressure. The foods that are highest in these nutrients are fruits, vegetables, low-fat dairy products, nuts, seeds, and legumes. But taking calcium, potassium, and magnesium supplements instead of eating foods that are high in those nutrients does not have the same effect. The DASH diet also includes whole grains, fish, and poultry. The DASH diet is one of several lifestyle changes your doctor may recommend to lower your high blood pressure. Your doctor may also want you to decrease the amount of sodium in your diet. Lowering sodium while following the DASH diet can lower blood pressure even further than just the DASH diet alone. Follow-up care is a key part of your treatment and safety. Be sure to make and go to all appointments, and call your doctor if you are having problems. It's also a good idea to know your test results and keep a list of the medicines you take. How can you care for yourself at home? Following the DASH diet · Eat 4 to 5 servings of fruit each day. A serving is 1 medium-sized piece of fruit, ½ cup chopped or canned fruit, 1/4 cup dried fruit, or 4 ounces (½ cup) of fruit juice. Choose fruit more often than fruit juice. · Eat 4 to 5 servings of vegetables each day. A serving is 1 cup of lettuce or raw leafy vegetables, ½ cup of chopped or cooked vegetables, or 4 ounces (½ cup) of vegetable juice. Choose vegetables more often than vegetable juice. · Get 2 to 3 servings of low-fat and fat-free dairy each day. A serving is 8 ounces of milk, 1 cup of yogurt, or 1 ½ ounces of cheese. · Eat 6 to 8 servings of grains each day. A serving is 1 slice of bread, 1 ounce of dry cereal, or ½ cup of cooked rice, pasta, or cooked cereal. Try to choose whole-grain products as much as possible. · Limit lean meat, poultry, and fish to 2 servings each day. A serving is 3 ounces, about the size of a deck of cards. · Eat 4 to 5 servings of nuts, seeds, and legumes (cooked dried beans, lentils, and split peas) each week. A serving is 1/3 cup of nuts, 2 tablespoons of seeds, or ½ cup of cooked beans or peas. · Limit fats and oils to 2 to 3 servings each day. A serving is 1 teaspoon of vegetable oil or 2 tablespoons of salad dressing. · Limit sweets and added sugars to 5 servings or less a week. A serving is 1 tablespoon jelly or jam, ½ cup sorbet, or 1 cup of lemonade. · Eat less than 2,300 milligrams (mg) of sodium a day. If you limit your sodium to 1,500 mg a day, you can lower your blood pressure even more. Tips for success · Start small. Do not try to make dramatic changes to your diet all at once. You might feel that you are missing out on your favorite foods and then be more likely to not follow the plan. Make small changes, and stick with them. Once those changes become habit, add a few more changes. · Try some of the following: ? Make it a goal to eat a fruit or vegetable at every meal and at snacks. This will make it easy to get the recommended amount of fruits and vegetables each day. ? Try yogurt topped with fruit and nuts for a snack or healthy dessert. ? Add lettuce, tomato, cucumber, and onion to sandwiches. ? Combine a ready-made pizza crust with low-fat mozzarella cheese and lots of vegetable toppings. Try using tomatoes, squash, spinach, broccoli, carrots, cauliflower, and onions. ? Have a variety of cut-up vegetables with a low-fat dip as an appetizer instead of chips and dip. ? Sprinkle sunflower seeds or chopped almonds over salads.  Or try adding chopped walnuts or almonds to cooked vegetables. ? Try some vegetarian meals using beans and peas. Add garbanzo or kidney beans to salads. Make burritos and tacos with mashed reyes beans or black beans. Where can you learn more? Go to http://sandor-raghu.info/ Enter I909 in the search box to learn more about \"DASH Diet: Care Instructions. \" Current as of: December 16, 2019               Content Version: 12.5 © 7038-1307 [employer]. Care instructions adapted under license by [employer] (which disclaims liability or warranty for this information). If you have questions about a medical condition or this instruction, always ask your healthcare professional. [address] any warranty or liability for your use of this information.

## 2020-06-16 ENCOUNTER — VIRTUAL VISIT (OUTPATIENT)
Dept: FAMILY MEDICINE CLINIC | Age: 45
End: 2020-06-16

## 2020-06-16 DIAGNOSIS — E66.01 SEVERE OBESITY (HCC): ICD-10-CM

## 2020-06-16 DIAGNOSIS — E78.00 PURE HYPERCHOLESTEROLEMIA: ICD-10-CM

## 2020-06-16 DIAGNOSIS — I10 ESSENTIAL HYPERTENSION: Primary | ICD-10-CM

## 2020-06-16 DIAGNOSIS — R79.89 ELEVATED SERUM CREATININE: ICD-10-CM

## 2020-06-16 RX ORDER — IRBESARTAN 150 MG/1
150 TABLET ORAL
Qty: 90 TAB | Refills: 1 | Status: SHIPPED | OUTPATIENT
Start: 2020-06-16 | End: 2020-12-14

## 2020-06-16 RX ORDER — LOSARTAN POTASSIUM 50 MG/1
TABLET ORAL
Qty: 90 TAB | Refills: 1 | Status: SHIPPED | OUTPATIENT
Start: 2020-06-16 | End: 2020-06-16 | Stop reason: RX

## 2020-06-16 RX ORDER — AMLODIPINE BESYLATE 5 MG/1
5 TABLET ORAL DAILY
Qty: 90 TAB | Refills: 1 | Status: SHIPPED | OUTPATIENT
Start: 2020-06-16 | End: 2020-12-14

## 2020-06-16 NOTE — TELEPHONE ENCOUNTER
MD Gerber,    Losartan is on backorder again. Pharmacy requesting alternative. Irbesartan and valsartan covered with limitations. Olmesartan not covered. Attached Irbesartan 150mg if appropriate. Thanks, Addis Arreola    Last Visit: VSANDY today  MD Gerber  Previous Refill Encounter(s): 6/16/20  Not filled- losartan unavailable      Requested Prescriptions     Pending Prescriptions Disp Refills    irbesartan (AVAPRO) 150 mg tablet 90 Tab 0     Sig: Take 1 Tab by mouth nightly.

## 2020-06-16 NOTE — PROGRESS NOTES
Chief Complaint   Patient presents with    Follow-up     pt c/o late menstural period     Other     pt is concerned with high BP      1. Have you been to the ER, urgent care clinic since your last visit? Hospitalized since your last visit? No     2. Have you seen or consulted any other health care providers outside of the [address] [place] [address] since your last visit? Include any pap smears or colon screening.  No

## 2020-12-14 RX ORDER — AMLODIPINE BESYLATE 5 MG/1
TABLET ORAL
Qty: 90 TAB | Refills: 1 | Status: SHIPPED | OUTPATIENT
Start: 2020-12-14 | End: 2021-06-08 | Stop reason: SDUPTHER

## 2021-02-19 RX ORDER — IRBESARTAN 150 MG/1
150 TABLET ORAL
Qty: 90 TAB | Refills: 0 | Status: CANCELLED | OUTPATIENT
Start: 2021-02-19

## 2021-02-19 NOTE — TELEPHONE ENCOUNTER
Last Visit: VV 6/16/20 MD Gerber  Next Appointment: Not scheduled-     MD Gerber had requested patient schedule for Jan/Feb 2021 with 12/14/20 refill. Previous Refill Encounter(s): 12/14/20 90    Requested Prescriptions     Pending Prescriptions Disp Refills    irbesartan (AVAPRO) 150 mg tablet 90 Tab 0     Sig: Take 1 Tab by mouth nightly. Appt due for further refills!

## 2021-03-04 NOTE — TELEPHONE ENCOUNTER
Patient requesting refill    .   Requested Prescriptions     Pending Prescriptions Disp Refills    amLODIPine (NORVASC) 5 mg tablet 90 Tab 1           BCB#[phone]    UOV 04/27/2021 Dr. Max Foster

## 2021-03-18 RX ORDER — IRBESARTAN 150 MG/1
150 TABLET ORAL
Qty: 90 TAB | Refills: 0 | Status: SHIPPED | OUTPATIENT
Start: 2021-03-18 | End: 2021-06-21 | Stop reason: SDUPTHER

## 2021-03-18 NOTE — TELEPHONE ENCOUNTER
Last Visit: 6/16/20 MD Gerber  Next Appointment: 4/27/21 MD Baron Franco  Previous Refill Encounter(s): 12/14/20 90    Requested Prescriptions     Pending Prescriptions Disp Refills    irbesartan (AVAPRO) 150 mg tablet 90 Tab 0     Sig: Take 1 Tab by mouth nightly.

## 2021-04-27 ENCOUNTER — OFFICE VISIT (OUTPATIENT)
Dept: FAMILY MEDICINE CLINIC | Age: 46
End: 2021-04-27
Payer: COMMERCIAL

## 2021-04-27 VITALS
HEIGHT: 64 IN | WEIGHT: 200.8 LBS | DIASTOLIC BLOOD PRESSURE: 82 MMHG | BODY MASS INDEX: 34.28 KG/M2 | TEMPERATURE: 98.4 F | HEART RATE: 85 BPM | RESPIRATION RATE: 16 BRPM | OXYGEN SATURATION: 98 % | SYSTOLIC BLOOD PRESSURE: 118 MMHG

## 2021-04-27 DIAGNOSIS — E55.9 VITAMIN D INSUFFICIENCY: ICD-10-CM

## 2021-04-27 DIAGNOSIS — Z91.89 AT RISK FOR OBSTRUCTIVE SLEEP APNEA: ICD-10-CM

## 2021-04-27 DIAGNOSIS — Z00.00 ANNUAL PHYSICAL EXAM: Primary | ICD-10-CM

## 2021-04-27 DIAGNOSIS — Z13.29 SCREENING FOR THYROID DISORDER: ICD-10-CM

## 2021-04-27 DIAGNOSIS — E78.00 PURE HYPERCHOLESTEROLEMIA: ICD-10-CM

## 2021-04-27 DIAGNOSIS — Z13.1 SCREENING FOR DIABETES MELLITUS: ICD-10-CM

## 2021-04-27 DIAGNOSIS — E66.01 SEVERE OBESITY (HCC): ICD-10-CM

## 2021-04-27 DIAGNOSIS — I10 ESSENTIAL HYPERTENSION: ICD-10-CM

## 2021-04-27 PROCEDURE — 99396 PREV VISIT EST AGE 40-64: CPT | Performed by: FAMILY MEDICINE

## 2021-04-27 NOTE — PROGRESS NOTES
Suzanne Lloyd  39 y.o. female  1975  [address] [facility]  [id number]     [facility] PRACTICE       Encounter Date: 4/27/2021           Established Patient Visit Note: Mily Castillo MD    Reason for Appointment:  Chief Complaint   Patient presents with   [address]     would like CPE       History of Present Illness:  History provided by patient    Suzanne Lloyd is a 39 y.o. female who presents to clinic today for:    Labs ordered at last visit, but she has not yet had    Annual Physical  Last Physical: > 1 year ago  Immunizations: She reports receiving her COVID vaccine, last Tdap in 2018  Screenings: gets PAP/Mammogram through OBGYN (Dr. Flako Yeboah), denies concern for depression or STI  Diet/Exercise: She reports eating smootheis in the morning and a boiled egg. She has been working out more. Had started back with drinking soda, but she is working to start back with water. 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 She walks 4-6 miles 4 days per week. She gets a lot of exercise with work (works at [employer] on the weekend)  Interval History: no recent hospitalizations or illnesses    HTN: She reports BP at home have been in 431D systolic over 92S diastolic  Abnormal Thyroid Tests: she reports seeing an endocrinologist (Dr. Teodora Bello) in the past who rechecked and told her that things were normal.     The 10-year ASCVD risk score (Dorina Metzger, et al., 2013) is: 1.7%    Values used to calculate the score:      Age: 39 years      Sex: Female      Is Non- : Yes      Diabetic: No      Tobacco smoker: No      Systolic Blood Pressure: 879 mmHg      Is BP treated: Yes      HDL Cholesterol: 49 MG/DL      Total Cholesterol: 210 MG/DL      Review of Systems  All other ROS were reviewed and are negative except as discussed in HPI        Allergies: Patient has no known allergies.     Medications: (Updated to reflect final medication list after visit)    Current Outpatient Medications:     irbesartan (AVAPRO) 150 mg tablet, Take 1 Tab by mouth nightly., Disp: 90 Tab, Rfl: 0    amLODIPine (NORVASC) 5 mg tablet, TAKE 1 TABLET BY MOUTH EVERY DAY, Disp: 90 Tab, Rfl: 1    cholecalciferol (VITAMIN D3) (1000 Units /25 mcg) tablet, Take 1 Tab by mouth daily. , Disp: , Rfl:     elderberry fruit (ELDERBERRY PO), Take  by mouth., Disp: , Rfl:     History  Patient Care Team:  Annette Elmore MD as PCP - General (Family Medicine)  Annette Elmore MD as PCP - [address] [place] [address] [facility]  Paulie Santo MD as Physician (Obstetrics & Gynecology)    Past Medical History: she has a past medical history of Abnormal Pap smear of cervix (2013). Past Surgical History: she has a past surgical history that includes hx wisdom teeth extraction (01/2021). Family Medical History: family history includes Cancer in her paternal aunt and paternal uncle; HIV/AIDS in her mother; Hypertension in her father, maternal aunt, mother, and paternal aunt; Kidney Disease in her maternal grandmother. Social History: she reports that she has never smoked. She has never used smokeless tobacco. She reports that she does not drink alcohol or use drugs. There are no preventive care reminders to display for this patient. Objective:   Visit Vitals  /82 (BP 1 Location: Left upper arm, BP Patient Position: Sitting)   Pulse 85   Temp 98.4 °F (36.9 °C) (Oral)   Resp 16   Ht 5' 4\" (1.626 m)   Wt 200 lb 12.8 oz (91.1 kg)   LMP 04/14/2021   SpO2 98%   BMI 34.47 kg/m²     Wt Readings from Last 3 Encounters:   04/27/21 200 lb 12.8 oz (91.1 kg)   01/07/20 206 lb 3.2 oz (93.5 kg)   11/25/19 212 lb (96.2 kg)       Physical Exam  Vitals signs reviewed. Constitutional:       General: She is not in acute distress. Appearance: Normal appearance. She is normal weight. She is not ill-appearing or toxic-appearing.    HENT:      Head: Normocephalic and atraumatic. Right Ear: Hearing, tympanic membrane, ear canal and external ear normal. No drainage. No middle ear effusion. There is no impacted cerumen. Tympanic membrane is not injected or erythematous. Left Ear: Hearing, tympanic membrane, ear canal and external ear normal. No drainage. No middle ear effusion. There is no impacted cerumen. Tympanic membrane is not injected or erythematous. Nose: Nose normal. No nasal deformity or septal deviation. Mouth/Throat:      Lips: Pink. No lesions. Mouth: Mucous membranes are moist.      Palate: No mass. Pharynx: Oropharynx is clear. Uvula midline. No pharyngeal swelling, oropharyngeal exudate or posterior oropharyngeal erythema. Tonsils: No tonsillar exudate. Eyes:      General: Lids are normal. Vision grossly intact. Gaze aligned appropriately. Right eye: No discharge. Left eye: No discharge. Extraocular Movements: Extraocular movements intact. Conjunctiva/sclera: Conjunctivae normal.      Right eye: Right conjunctiva is not injected. Left eye: Left conjunctiva is not injected. Pupils: Pupils are equal, round, and reactive to light. Neck:      Musculoskeletal: Normal range of motion and neck supple. No neck rigidity or muscular tenderness. Vascular: No carotid bruit. Cardiovascular:      Rate and Rhythm: Normal rate and regular rhythm. Pulses: Normal pulses. Heart sounds: Normal heart sounds. No murmur. No friction rub. No gallop. Pulmonary:      Effort: Pulmonary effort is normal. No respiratory distress. Breath sounds: Normal breath sounds. No stridor. No wheezing, rhonchi or rales. Abdominal:      General: Bowel sounds are normal. There is no distension or abdominal bruit. Tenderness: There is no abdominal tenderness. There is no guarding. Musculoskeletal: Normal range of motion.       Cervical back: Normal.   Lymphadenopathy:      Cervical: No cervical adenopathy. Skin:     General: Skin is warm. Coloration: Skin is not jaundiced. Neurological:      General: No focal deficit present. Mental Status: She is alert. Motor: Motor function is intact. Coordination: Coordination is intact. Gait: Gait is intact. Deep Tendon Reflexes:      Reflex Scores:       Patellar reflexes are 2+ on the right side and 2+ on the left side. Psychiatric:         Attention and Perception: Attention and perception normal.         Mood and Affect: Mood and affect normal.         Speech: Speech normal.         Cognition and Memory: Cognition and memory normal.         Assessment & Plan:      ICD-10-CM ICD-9-CM    1. Annual physical exam  Z00.00 V70.0    2. At risk for obstructive sleep apnea  Z91.89 V49.89 SLEEP MEDICINE REFERRAL   3. Vitamin D insufficiency  E55.9 268.9 VITAMIN D, 25 HYDROXY      VITAMIN D, 25 HYDROXY   4. Severe obesity (HCC)  E66.01 278.01    5. Pure hypercholesterolemia  E78.00 272.0 LIPID PANEL      METABOLIC PANEL, COMPREHENSIVE      METABOLIC PANEL, COMPREHENSIVE      LIPID PANEL   6. Essential hypertension  I10 401.9 CBC W/O DIFF      CBC W/O DIFF   7. Screening for thyroid disorder  Z13.29 V77.0 T4, FREE      T3 TOTAL      TSH 3RD GENERATION      TSH 3RD GENERATION      T3 TOTAL      T4, FREE   8. Screening for diabetes mellitus  Z13.1 V77.1 HEMOGLOBIN A1C WITH EAG      HEMOGLOBIN A1C WITH EAG     · Annual Physical Exam: Reviewed and addressed patient's medical history and concerns as discussed in note. Reviewed recommended screenings and immunizations. Discussed recommendations for diet, exercise, and lifestyle. · At Risk for Obstructive Sleep Apnea: Patient with symptoms concerning for JARVIS. Will provide referral to sleep medicine for further evaluation.  All other conditions listed above: chronic and stable. Will continue current treatment regimen. Will check labs as reflected above.  Discussed following up with specialist as scheduled. Discussed recommendations for diet and exercise. I have discussed the diagnosis with the patient and the intended plan as seen in the above orders. The patient has received an after-visit summary along with patient information handout. I have discussed medication side effects and warnings with the patient as well. Disposition  Follow-up and Dispositions    · Return in about 6 months (around 10/27/2021).            Marion Craig MD

## 2021-04-27 NOTE — PROGRESS NOTES
Chief Complaint   Patient presents with   [address]     would like CPE     1. Have you been to the ER, urgent care clinic since your last visit? Hospitalized since your last visit? No    2. Have you seen or consulted any other health care providers outside of the [address] [place] [address] [facility] since your last visit? Include any pap smears or colon screening.  Yes Where: gyn Dr Radha Gates in Feb/March 2021    Pap 11/25/20  mammo ordered by Dr Radha Gates [facility] EMERGENCY [facility]

## 2021-04-28 LAB
25(OH)D3 SERPL-MCNC: 39.2 NG/ML (ref 30–100)
ALBUMIN SERPL-MCNC: 3.5 G/DL (ref 3.5–5)
ALBUMIN/GLOB SERPL: 1 {RATIO} (ref 1.1–2.2)
ALP SERPL-CCNC: 122 U/L (ref 45–117)
ALT SERPL-CCNC: 21 U/L (ref 12–78)
ANION GAP SERPL CALC-SCNC: 4 MMOL/L (ref 5–15)
AST SERPL-CCNC: 16 U/L (ref 15–37)
BILIRUB SERPL-MCNC: 0.4 MG/DL (ref 0.2–1)
BUN SERPL-MCNC: 11 MG/DL (ref 6–20)
BUN/CREAT SERPL: 14 (ref 12–20)
CALCIUM SERPL-MCNC: 9.4 MG/DL (ref 8.5–10.1)
CHLORIDE SERPL-SCNC: 109 MMOL/L (ref 97–108)
CHOLEST SERPL-MCNC: 210 MG/DL
CO2 SERPL-SCNC: 27 MMOL/L (ref 21–32)
CREAT SERPL-MCNC: 0.8 MG/DL (ref 0.55–1.02)
ERYTHROCYTE [DISTWIDTH] IN BLOOD BY AUTOMATED COUNT: 13.9 % (ref 11.5–14.5)
EST. AVERAGE GLUCOSE BLD GHB EST-MCNC: 108 MG/DL
GLOBULIN SER CALC-MCNC: 3.5 G/DL (ref 2–4)
GLUCOSE SERPL-MCNC: 82 MG/DL (ref 65–100)
HBA1C MFR BLD: 5.4 % (ref 4–5.6)
HCT VFR BLD AUTO: 43.2 % (ref 35–47)
HDLC SERPL-MCNC: 49 MG/DL
HDLC SERPL: 4.3 {RATIO} (ref 0–5)
HGB BLD-MCNC: 13 G/DL (ref 11.5–16)
LDLC SERPL CALC-MCNC: 138.8 MG/DL (ref 0–100)
LIPID PROFILE,FLP: ABNORMAL
MCH RBC QN AUTO: 27.5 PG (ref 26–34)
MCHC RBC AUTO-ENTMCNC: 30.1 G/DL (ref 30–36.5)
MCV RBC AUTO: 91.3 FL (ref 80–99)
NRBC # BLD: 0 K/UL (ref 0–0.01)
NRBC BLD-RTO: 0 PER 100 WBC
PLATELET # BLD AUTO: 343 K/UL (ref 150–400)
PMV BLD AUTO: 11.7 FL (ref 8.9–12.9)
POTASSIUM SERPL-SCNC: 5 MMOL/L (ref 3.5–5.1)
PROT SERPL-MCNC: 7 G/DL (ref 6.4–8.2)
RBC # BLD AUTO: 4.73 M/UL (ref 3.8–5.2)
SODIUM SERPL-SCNC: 140 MMOL/L (ref 136–145)
T4 FREE SERPL-MCNC: 1 NG/DL (ref 0.8–1.5)
TRIGL SERPL-MCNC: 111 MG/DL (ref ?–150)
TSH SERPL DL<=0.05 MIU/L-ACNC: 0.8 UIU/ML (ref 0.36–3.74)
VLDLC SERPL CALC-MCNC: 22.2 MG/DL
WBC # BLD AUTO: 9.3 K/UL (ref 3.6–11)

## 2021-04-29 LAB — T3 SERPL-MCNC: 121 NG/DL (ref 71–180)

## 2021-05-21 RX ORDER — AMLODIPINE BESYLATE 5 MG/1
TABLET ORAL
Qty: 90 TABLET | Refills: 1 | OUTPATIENT
Start: 2021-05-21

## 2021-06-08 RX ORDER — AMLODIPINE BESYLATE 5 MG/1
5 TABLET ORAL DAILY
Qty: 90 TABLET | Refills: 1 | Status: SHIPPED | OUTPATIENT
Start: 2021-06-08 | End: 2021-12-09 | Stop reason: SDUPTHER

## 2021-06-08 NOTE — TELEPHONE ENCOUNTER
Last visit 04/27/2021 MD Valdivia   Next appointment 10/27/2021 MD Valdivia   Previous refill encounter(s)   12/14/2020 Norvasc #90 with 1 refill     Requested Prescriptions     Pending Prescriptions Disp Refills    amLODIPine (NORVASC) 5 mg tablet 90 Tablet 1     Sig: Take 1 Tablet by mouth daily.

## 2021-06-21 RX ORDER — IRBESARTAN 150 MG/1
150 TABLET ORAL
Qty: 90 TABLET | Refills: 1 | Status: SHIPPED | OUTPATIENT
Start: 2021-06-21 | End: 2021-12-09 | Stop reason: SDUPTHER

## 2021-06-21 NOTE — TELEPHONE ENCOUNTER
Last visit 04/27/2021 MD Lena Moses   Next appointment 10/27/2021 MD Lena Moses   Previous refill encounter(s)   03/18/2021 Avapro #90    Requested Prescriptions     Pending Prescriptions Disp Refills    irbesartan (AVAPRO) 150 mg tablet 90 Tablet 1     Sig: Take 1 Tablet by mouth nightly.

## 2021-10-27 ENCOUNTER — OFFICE VISIT (OUTPATIENT)
Dept: FAMILY MEDICINE CLINIC | Age: 46
End: 2021-10-27
Payer: COMMERCIAL

## 2021-10-27 VITALS
HEIGHT: 64 IN | BODY MASS INDEX: 34.83 KG/M2 | RESPIRATION RATE: 16 BRPM | DIASTOLIC BLOOD PRESSURE: 75 MMHG | HEART RATE: 87 BPM | WEIGHT: 204 LBS | OXYGEN SATURATION: 98 % | SYSTOLIC BLOOD PRESSURE: 110 MMHG | TEMPERATURE: 98.8 F

## 2021-10-27 DIAGNOSIS — Z23 ENCOUNTER FOR IMMUNIZATION: Primary | ICD-10-CM

## 2021-10-27 DIAGNOSIS — E55.9 VITAMIN D INSUFFICIENCY: ICD-10-CM

## 2021-10-27 DIAGNOSIS — I10 ESSENTIAL HYPERTENSION: ICD-10-CM

## 2021-10-27 DIAGNOSIS — E78.00 PURE HYPERCHOLESTEROLEMIA: ICD-10-CM

## 2021-10-27 DIAGNOSIS — D21.9 FIBROIDS: ICD-10-CM

## 2021-10-27 DIAGNOSIS — E66.01 SEVERE OBESITY (HCC): ICD-10-CM

## 2021-10-27 LAB
25(OH)D3 SERPL-MCNC: 29.6 NG/ML (ref 30–100)
ALBUMIN SERPL-MCNC: 3.1 G/DL (ref 3.5–5)
ALBUMIN/GLOB SERPL: 0.8 {RATIO} (ref 1.1–2.2)
ALP SERPL-CCNC: 90 U/L (ref 45–117)
ALT SERPL-CCNC: 19 U/L (ref 12–78)
ANION GAP SERPL CALC-SCNC: 1 MMOL/L (ref 5–15)
AST SERPL-CCNC: 13 U/L (ref 15–37)
BILIRUB SERPL-MCNC: 0.3 MG/DL (ref 0.2–1)
BUN SERPL-MCNC: 9 MG/DL (ref 6–20)
BUN/CREAT SERPL: 9 (ref 12–20)
CALCIUM SERPL-MCNC: 9.5 MG/DL (ref 8.5–10.1)
CHLORIDE SERPL-SCNC: 109 MMOL/L (ref 97–108)
CO2 SERPL-SCNC: 30 MMOL/L (ref 21–32)
CREAT SERPL-MCNC: 1.03 MG/DL (ref 0.55–1.02)
ERYTHROCYTE [DISTWIDTH] IN BLOOD BY AUTOMATED COUNT: 13.9 % (ref 11.5–14.5)
GLOBULIN SER CALC-MCNC: 3.9 G/DL (ref 2–4)
GLUCOSE SERPL-MCNC: 89 MG/DL (ref 65–100)
HCT VFR BLD AUTO: 41.1 % (ref 35–47)
HGB BLD-MCNC: 12.8 G/DL (ref 11.5–16)
MCH RBC QN AUTO: 27.9 PG (ref 26–34)
MCHC RBC AUTO-ENTMCNC: 31.1 G/DL (ref 30–36.5)
MCV RBC AUTO: 89.5 FL (ref 80–99)
NRBC # BLD: 0.02 K/UL (ref 0–0.01)
NRBC BLD-RTO: 0.2 PER 100 WBC
PLATELET # BLD AUTO: 321 K/UL (ref 150–400)
PMV BLD AUTO: 12 FL (ref 8.9–12.9)
POTASSIUM SERPL-SCNC: 4.6 MMOL/L (ref 3.5–5.1)
PROT SERPL-MCNC: 7 G/DL (ref 6.4–8.2)
RBC # BLD AUTO: 4.59 M/UL (ref 3.8–5.2)
SODIUM SERPL-SCNC: 140 MMOL/L (ref 136–145)
TSH SERPL DL<=0.05 MIU/L-ACNC: 0.99 UIU/ML (ref 0.36–3.74)
WBC # BLD AUTO: 10.8 K/UL (ref 3.6–11)

## 2021-10-27 PROCEDURE — 90471 IMMUNIZATION ADMIN: CPT | Performed by: FAMILY MEDICINE

## 2021-10-27 PROCEDURE — 90686 IIV4 VACC NO PRSV 0.5 ML IM: CPT | Performed by: FAMILY MEDICINE

## 2021-10-27 PROCEDURE — 99214 OFFICE O/P EST MOD 30 MIN: CPT | Performed by: FAMILY MEDICINE

## 2021-10-27 RX ORDER — BUPROPION HYDROCHLORIDE 150 MG/1
150 TABLET ORAL DAILY
Qty: 90 TABLET | Refills: 0 | Status: SHIPPED | OUTPATIENT
Start: 2021-10-27 | End: 2022-01-28 | Stop reason: SDUPTHER

## 2021-10-27 NOTE — PROGRESS NOTES
Chief Complaint   Patient presents with    Follow-up        1. \"Have you been to the ER, urgent care clinic since your last visit? Hospitalized since your last visit? \" No    2. \"Have you seen or consulted any other health care providers outside of the [address] [place] [address] since your last visit? \" No     3. For patients aged 39-70: Has the patient had a colonoscopy? No     If the patient is female:    4. For patients aged 41-77: Has the patient had a mammogram within the past 2 years? Yes, HM satisfied with blue hyperlink    5. For patients aged 21-30: Has the patient had a pap smear?  Yes, HM satisfied with blue hyperlink    3 most recent PHQ Screens 4/27/2021   Little interest or pleasure in doing things Not at all   Feeling down, depressed, irritable, or hopeless Not at all   Total Score PHQ 2 0

## 2021-10-27 NOTE — PATIENT INSTRUCTIONS
Bupropion (By mouth)   Bupropion ([id number]-on)  Treats depression and aids in quitting smoking. Also prevents depression caused by seasonal affective disorder (SAD). Brand Name(s): Aplenzin, Forfivo XL, Wellbutrin, Wellbutrin SR, Wellbutrin XL, Zyban   There may be other brand names for this medicine. When This Medicine Should Not Be Used: This medicine is not right for everyone. Do not use it if you had an allergic reaction to bupropion, or if you have seizures, anorexia, or bulimia. How to Use This Medicine:   Tablet, Long Acting Tablet  · Take your medicine as directed. Your dose may need to be changed several times to find what works best for you. · You may need to take Wellbutrin® for up to 4 weeks before you feel better. You may need to take Zyban® for 1 to 2 weeks before the date that you plan to stop smoking. · Swallow the extended-release tablet whole. Do not crush, break, or chew it. · It is best to take Aplenzin® in the morning. · Do not take Wellbutrin® or Zyban® close to bedtime if you have trouble sleeping. · Take it with food if it upsets your stomach or if you have nausea. · If you take the extended-release tablet, part of the tablet may pass into your stools. This is normal and is nothing to worry about. · This medicine should come with a Medication Guide. Ask your pharmacist for a copy if you do not have one. · Missed dose: Skip the missed dose and go back to your regular dosing schedule. Never take extra medicine to make up for a missed dose. · Store the medicine in a closed container at room temperature, away from heat, moisture, and direct light. Drugs and Foods to Avoid:   Ask your doctor or pharmacist before using any other medicine, including over-the-counter medicines, vitamins, and herbal products. · Do not use this medicine and an MAO inhibitor (MAOI) within 14 days of each other.  Do not use Zyban® to quit smoking if you already take Aplenzin® or Wellbutrin® for depression, because they are the same medicine. · Tell your doctor if you take barbiturates, benzodiazepines, antiseizure medicine, or sedatives, or if you recently stopped taking them. · Some medicines can affect how bupropion works. Tell your doctor if you use any of the following:   ¨ Amantadine, carbamazepine, cimetidine, clopidogrel, cyclophosphamide, digoxin, efavirenz, levodopa, lopinavir, nelfinavir, nicotine patch, orphenadrine, phenobarbital, phenytoin, ritonavir, tamoxifen, theophylline, thiotepa, ticlopidine  ¨ Beta blocker medicine (including metoprolol)  ¨ A blood thinner (including warfarin)  ¨ Insulin or diabetes medicine  ¨ Medicine to treat depression (including desipramine, fluoxetine, imipramine, nortriptyline, paroxetine, sertraline, venlafaxine)  ¨ Medicine to treat heart rhythm problems (including flecainide, propafenone)  ¨ Medicine to treat mental illness (including haloperidol, risperidone, thioridazine)  ¨ Steroid medicine (including dexamethasone, hydrocortisone, methylprednisolone, prednisolone, prednisone)  · Do not drink alcohol while you are using this medicine. · Tell your doctor if you use anything else that makes you sleepy. Some examples are allergy medicine, narcotic pain medicine, and alcohol. Warnings While Using This Medicine:   · Tell your doctor if you are pregnant or breastfeeding, or if you have kidney disease, liver disease, heart disease, diabetes, glaucoma, mental illness (including bipolar disorder), or high blood pressure. Tell your doctor if you have a history of drug addiction or if you drink alcohol. · For some children, teenagers, and young adults, this medicine may increase mental or emotional problems. This may lead to thoughts of suicide and violence. Talk with your doctor right away if you have any thoughts or behavior changes that concern you. Tell your doctor if you or anyone in your family has a history of bipolar disorder or suicide attempts.   · This medicine may cause the following problems:  ¨ Increased risk of seizures  ¨ Changes in mood or behavior  ¨ High blood pressure  ¨ Serious skin reactions  · This medicine may make you dizzy or drowsy. Do not drive or do anything that could be dangerous until you know how this medicine affects you. · Zyban® is only part of a complete program to help you quit smoking. You may still want to smoke at times. Have a plan to cope with these situations. · Do not stop using this medicine suddenly. Your doctor will need to slowly decrease your dose before you stop it completely. · Tell any doctor or dentist who treats you that you are using this medicine. This medicine may affect certain medical test results. · Your doctor will check your progress and the effects of this medicine at regular visits. Keep all appointments. · Keep all medicine out of the reach of children. Never share your medicine with anyone. Possible Side Effects While Using This Medicine:   Call your doctor right away if you notice any of these side effects:  · Allergic reaction: Itching or hives, swelling in your face or hands, swelling or tingling in your mouth or throat, chest tightness, trouble breathing  · Blistering, peeling, red skin rash  · Chest pain, trouble breathing, fast, slow, or uneven heartbeat  · Eye pain, vision changes, seeing halos around lights  · Muscle or joint pain, fever with rash  · Seeing or hearing things that are not there, feeling like people are against you  · Seizures  · Sudden increase in energy, racing thoughts, trouble sleeping  · Thoughts of hurting yourself, worsening depression, severe agitation or confusion  If you notice these less serious side effects, talk with your doctor:   · Dry mouth  · Headache, dizziness  · Nausea, vomiting, constipation, diarrhea, gas, stomach pain  · Weight gain or loss  If you notice other side effects that you think are caused by this medicine, tell your doctor.    Call your doctor for medical advice about side effects. You may report side effects to FDA at 6-510-TWE-7965  © 2017 [facility] Information is for End User's use only and may not be sold, redistributed or otherwise used for commercial purposes. The above information is an  only. It is not intended as medical advice for individual conditions or treatments. Talk to your doctor, nurse or pharmacist before following any medical regimen to see if it is safe and effective for you.

## 2021-10-27 NOTE — PROGRESS NOTES
Alida Gerard  42 y.o. female  1975  [address] [facility]  [id number]     [facility]       Encounter Date: 10/27/2021           Established Patient Visit Note: Yair Alfaro MD    Reason for Appointment:  Chief Complaint   Patient presents with    Follow-up       History of Present Illness:  History provided by patient    Alida Gerard is a 42 y.o. female who presents to clinic today for:    · Fibroids: she is scheduled for hysterectomy in November  · Obesity: she reports that she had been doing well and was under 200 lbs, but recently started back at work and being busy and stressed has increased eating habits. · HLD: she is working to eat healthy and avoid greesy foods  · HTN: home BP have been 120s over 80s  · Vitamin D insufficieny: taking Vit D 1000 international units  daily      Review of Systems  Review of Systems   Constitutional: Negative for chills and fever. Respiratory: Negative for cough, shortness of breath and wheezing. Cardiovascular: Negative for chest pain and palpitations. Allergies: Patient has no known allergies. Medications: (Updated to reflect final medication list after visit)    Current Outpatient Medications:     buPROPion XL (WELLBUTRIN XL) 150 mg tablet, Take 1 Tablet by mouth daily. , Disp: 90 Tablet, Rfl: 0    irbesartan (AVAPRO) 150 mg tablet, Take 1 Tablet by mouth nightly., Disp: 90 Tablet, Rfl: 1    amLODIPine (NORVASC) 5 mg tablet, Take 1 Tablet by mouth daily. , Disp: 90 Tablet, Rfl: 1    cholecalciferol (VITAMIN D3) (1000 Units /25 mcg) tablet, Take 1 Tab by mouth daily. , Disp: , Rfl:     elderberry fruit (ELDERBERRY PO), Take  by mouth.  (Patient not taking: Reported on 10/27/2021), Disp: , Rfl:     History  Patient Care Team:  Jacky Boykin MD as PCP - General (Family Medicine)  Jacky Boykin MD as PCP - [facility] Empaneled Provider  Saroj Doan MD as Physician (Obstetrics & Gynecology)    Past Medical History: she has a past medical history of Abnormal Pap smear of cervix (2013). Past Surgical History: she has a past surgical history that includes hx wisdom teeth extraction (01/2021). Family Medical History: family history includes Cancer in her paternal aunt and paternal uncle; HIV/AIDS in her mother; Hypertension in her father, maternal aunt, mother, and paternal aunt; Kidney Disease in her maternal grandmother. Social History: she reports that she has never smoked. She has never used smokeless tobacco. She reports that she does not drink alcohol and does not use drugs. There are no preventive care reminders to display for this patient. Objective:   Visit Vitals  /75 (BP 1 Location: Left arm, BP Patient Position: Sitting, BP Cuff Size: Adult)   Pulse 87   Temp 98.8 °F (37.1 °C)   Resp 16   Ht 5' 4\" (1.626 m)   Wt 204 lb (92.5 kg)   SpO2 98%   BMI 35.02 kg/m²     Wt Readings from Last 3 Encounters:   10/27/21 204 lb (92.5 kg)   04/27/21 200 lb 12.8 oz (91.1 kg)   01/07/20 206 lb 3.2 oz (93.5 kg)       Physical Exam  Vitals and nursing note reviewed. Constitutional:       General: She is not in acute distress. Appearance: Normal appearance. HENT:      Head: Normocephalic and atraumatic. Nose: Nose normal.      Mouth/Throat:      Mouth: Mucous membranes are moist.   Eyes:      Extraocular Movements: Extraocular movements intact. Conjunctiva/sclera: Conjunctivae normal.      Pupils: Pupils are equal, round, and reactive to light. Cardiovascular:      Rate and Rhythm: Normal rate and regular rhythm. Pulses: Normal pulses. Heart sounds: Normal heart sounds. No murmur heard. No friction rub. No gallop. Pulmonary:      Effort: Pulmonary effort is normal. No respiratory distress. Breath sounds: Normal breath sounds. No wheezing, rhonchi or rales. Musculoskeletal:         General: Normal range of motion.       Cervical back: Normal range of motion and neck supple. No rigidity. No muscular tenderness. Lymphadenopathy:      Cervical: No cervical adenopathy. Skin:     General: Skin is warm. Coloration: Skin is not jaundiced. Neurological:      General: No focal deficit present. Mental Status: She is alert. Mental status is at baseline. Cranial Nerves: Cranial nerves are intact. Motor: Motor function is intact. Coordination: Coordination is intact. Psychiatric:         Mood and Affect: Mood normal.         Behavior: Behavior normal.         Thought Content: Thought content normal.         Judgment: Judgment normal.         Assessment & Plan:      ICD-10-CM ICD-9-CM    1. Encounter for immunization  Z23 V03.89 INFLUENZA VIRUS VAC QUAD,SPLIT,PRESV FREE SYRINGE IM   2. Vitamin D insufficiency  E55.9 268.9 VITAMIN D, 25 HYDROXY      VITAMIN D, 25 HYDROXY   3. Essential hypertension  E82 043.8 METABOLIC PANEL, COMPREHENSIVE      CBC W/O DIFF      TSH 3RD GENERATION      TSH 3RD GENERATION      CBC W/O DIFF      METABOLIC PANEL, COMPREHENSIVE   4. Severe obesity (HCC)  E66.01 278.01 buPROPion XL (WELLBUTRIN XL) 150 mg tablet   5. Fibroids  D21.9 215.9    6. Pure hypercholesterolemia  E78.00 272.0      · Obesity: Chronic, uncontrolled. discussed medication options and associated risks/benefits/side effects/precautions; patient would like to try Bupropion. Will start bupropion for weight loss. She will wait until after surgery to start and follow up with us mid December. · Firboids: Chronic, managed by GYN.  All other conditions listed above: chronic and stable. Will continue current treatment regimen. Will check labs as reflected above. Discussed following up with specialist as scheduled. Discussed recommendations for diet and exercise.  At Risk JARVIS: reprtined requistion for sleep medicine    I have discussed the diagnosis with the patient and the intended plan as seen in the above orders.   The patient has received an after-visit summary along with patient information handout. I have discussed medication side effects and warnings with the patient as well. Disposition  Follow-up and Dispositions    · Return in about 6 weeks (around 12/8/2021) for wieght loss.            Tawana Britt MD

## 2021-12-09 RX ORDER — IRBESARTAN 150 MG/1
150 TABLET ORAL
Qty: 90 TABLET | Refills: 1 | Status: SHIPPED | OUTPATIENT
Start: 2021-12-09 | End: 2022-06-07 | Stop reason: SDUPTHER

## 2021-12-09 RX ORDER — AMLODIPINE BESYLATE 5 MG/1
5 TABLET ORAL DAILY
Qty: 90 TABLET | Refills: 1 | Status: SHIPPED | OUTPATIENT
Start: 2021-12-09 | End: 2022-06-07 | Stop reason: SDUPTHER

## 2021-12-09 NOTE — TELEPHONE ENCOUNTER
Last Visit: 10/27/21 MD Janeth Tovar  Next Appointment: Not scheduled  Previous Refill Encounter(s):   Amlodipine 6/8/21 90 + 1  Irbesartan 6/21/21 90 + 1    Requested Prescriptions     Pending Prescriptions Disp Refills    amLODIPine (NORVASC) 5 mg tablet 90 Tablet 1     Sig: Take 1 Tablet by mouth daily.  irbesartan (AVAPRO) 150 mg tablet 90 Tablet 1     Sig: Take 1 Tablet by mouth nightly.

## 2022-01-28 DIAGNOSIS — E66.01 SEVERE OBESITY (HCC): ICD-10-CM

## 2022-01-28 RX ORDER — BUPROPION HYDROCHLORIDE 150 MG/1
150 TABLET ORAL DAILY
Qty: 90 TABLET | Refills: 1 | Status: SHIPPED | OUTPATIENT
Start: 2022-01-28

## 2022-01-28 NOTE — TELEPHONE ENCOUNTER
Last Visit: 10/27/21 MD Rachna Muse  Next Appointment: Not scheduled- no show 12/8/21  Previous Refill Encounter(s): 10/27/21 90    Requested Prescriptions     Pending Prescriptions Disp Refills    buPROPion XL (WELLBUTRIN XL) 150 mg tablet 90 Tablet 1     Sig: Take 1 Tablet by mouth daily.

## 2022-03-19 PROBLEM — E66.01 SEVERE OBESITY (HCC): Status: ACTIVE | Noted: 2019-02-22

## 2022-03-19 PROBLEM — R79.89 ELEVATED SERUM CREATININE: Status: ACTIVE | Noted: 2019-02-22

## 2022-03-19 PROBLEM — E55.9 VITAMIN D INSUFFICIENCY: Status: ACTIVE | Noted: 2019-02-21

## 2022-03-19 PROBLEM — I10 ESSENTIAL HYPERTENSION: Status: ACTIVE | Noted: 2020-01-06

## 2022-03-20 PROBLEM — E78.00 PURE HYPERCHOLESTEROLEMIA: Status: ACTIVE | Noted: 2020-01-06

## 2022-06-07 RX ORDER — AMLODIPINE BESYLATE 5 MG/1
5 TABLET ORAL DAILY
Qty: 90 TABLET | Refills: 1 | Status: SHIPPED | OUTPATIENT
Start: 2022-06-07 | End: 2022-08-02

## 2022-06-07 RX ORDER — IRBESARTAN 150 MG/1
150 TABLET ORAL
Qty: 90 TABLET | Refills: 1 | Status: SHIPPED | OUTPATIENT
Start: 2022-06-07

## 2022-06-07 NOTE — TELEPHONE ENCOUNTER
Last Visit: 10/27/21 MD Rhett Paul  Next Appointment: None  Previous Refill Encounter(s): 12/9/21 90 + 1 (both)    Requested Prescriptions     Pending Prescriptions Disp Refills    amLODIPine (NORVASC) 5 mg tablet 90 Tablet 1     Sig: Take 1 Tablet by mouth daily.  irbesartan (AVAPRO) 150 mg tablet 90 Tablet 1     Sig: Take 1 Tablet by mouth nightly.        For Pharmacy Admin Tracking Only     Intervention Detail: New Rx: 2, reason: Patient Preference   Time Spent (min): 1

## 2022-08-02 ENCOUNTER — OFFICE VISIT (OUTPATIENT)
Dept: FAMILY MEDICINE CLINIC | Age: 47
End: 2022-08-02
Payer: COMMERCIAL

## 2022-08-02 VITALS
OXYGEN SATURATION: 98 % | RESPIRATION RATE: 14 BRPM | BODY MASS INDEX: 30.8 KG/M2 | DIASTOLIC BLOOD PRESSURE: 82 MMHG | TEMPERATURE: 98.1 F | HEIGHT: 64 IN | SYSTOLIC BLOOD PRESSURE: 118 MMHG | HEART RATE: 74 BPM | WEIGHT: 180.4 LBS

## 2022-08-02 DIAGNOSIS — E55.9 VITAMIN D INSUFFICIENCY: ICD-10-CM

## 2022-08-02 DIAGNOSIS — Z13.1 SCREENING FOR DIABETES MELLITUS: ICD-10-CM

## 2022-08-02 DIAGNOSIS — I10 ESSENTIAL HYPERTENSION: ICD-10-CM

## 2022-08-02 DIAGNOSIS — Z00.00 ANNUAL PHYSICAL EXAM: Primary | ICD-10-CM

## 2022-08-02 DIAGNOSIS — E78.00 PURE HYPERCHOLESTEROLEMIA: ICD-10-CM

## 2022-08-02 PROCEDURE — 99396 PREV VISIT EST AGE 40-64: CPT | Performed by: FAMILY MEDICINE

## 2022-08-02 NOTE — PROGRESS NOTES
Marco Mcleod  42 y.o. female  1975  [address] [place] [address]  [id number]     [facility]       Encounter Date: 8/2/2022           Established Patient Visit Note: Addis Espinoza MD    Reason for Appointment:  Chief Complaint   Patient presents with    Hypertension    Complete Physical         History of Present Illness:  History provided by patient    Marco Mcleod is a 42 y.o. female who presents to clinic today for:     Annual Physical    Diet: she has changed her eating habits; avoiding soda and fast food  Exercise: she is working with a  and exercising regualrly    Hx of Fibroids s/p Hysterectomy: she had hysterectomy in Nov of last year. She reports that she only has one ovary remaining because the other had a cyst on it that had to be removed. Obesity: she has lost 24 lbs since last visit. HLD: she is working to eat healthy and avoid greesy foods  HTN: home BP have been 120s over 80s  Vitamin D insufficieny: taking Vit D 1000 international units  daily      HM  COVID Vaccine: she has had 3 shots        Review of Systems  All other ROS were reviewed and are negative except as discussed in HPI        Allergies: Patient has no known allergies. Medications:     Current Outpatient Medications:     irbesartan (AVAPRO) 150 mg tablet, Take 1 Tablet by mouth nightly., Disp: 90 Tablet, Rfl: 1    buPROPion XL (WELLBUTRIN XL) 150 mg tablet, Take 1 Tablet by mouth daily. , Disp: 90 Tablet, Rfl: 1    cholecalciferol (VITAMIN D3) (1000 Units /25 mcg) tablet, Take 1 Tab by mouth daily. , Disp: , Rfl:     elderberry fruit (ELDERBERRY PO), Take  by mouth.  (Patient not taking: Reported on 10/27/2021), Disp: , Rfl:     History  Patient Care Team:  Mary Freeman MD as PCP - General (Family Medicine)  Mary Freeman MD as PCP - [facility] Empaneled Provider  Wong Esposito MD as Physician (Obstetrics & Gynecology)    Past Medical History: she has a past medical history of Abnormal Pap smear of cervix (2013). Past Surgical History: she has a past surgical history that includes hx wisdom teeth extraction (01/2021); hx gyn; and hx hysterectomy (11/2021). Family Medical History: family history includes Cancer in her paternal aunt and paternal uncle; HIV/AIDS in her mother; Hypertension in her father, maternal aunt, mother, and paternal aunt; Kidney Disease in her maternal grandmother. Social History: she reports that she has never smoked. She has never used smokeless tobacco. She reports that she does not drink alcohol and does not use drugs. Objective:   Visit Vitals  /82 (BP 1 Location: Right arm, BP Patient Position: Sitting, BP Cuff Size: Adult)   Pulse 74   Temp 98.1 °F (36.7 °C) (Temporal)   Resp 14   Ht 5' 4\" (1.626 m)   Wt 180 lb 6.4 oz (81.8 kg)   LMP 04/14/2021   SpO2 98%   BMI 30.97 kg/m²     Wt Readings from Last 3 Encounters:   08/02/22 180 lb 6.4 oz (81.8 kg)   10/27/21 204 lb (92.5 kg)   04/27/21 200 lb 12.8 oz (91.1 kg)       Physical Exam  Vitals and nursing note reviewed. Constitutional:       General: She is not in acute distress. Appearance: Normal appearance. HENT:      Head: Normocephalic and atraumatic. Nose: Nose normal.      Mouth/Throat:      Mouth: Mucous membranes are moist.   Eyes:      Extraocular Movements: Extraocular movements intact. Conjunctiva/sclera: Conjunctivae normal.      Pupils: Pupils are equal, round, and reactive to light. Cardiovascular:      Rate and Rhythm: Normal rate and regular rhythm. Pulses: Normal pulses. Heart sounds: Normal heart sounds. No murmur heard. No friction rub. No gallop. Pulmonary:      Effort: Pulmonary effort is normal. No respiratory distress. Breath sounds: Normal breath sounds. No wheezing, rhonchi or rales. Musculoskeletal:         General: Normal range of motion. Cervical back: Normal range of motion and neck supple.  No rigidity. No muscular tenderness. Lymphadenopathy:      Cervical: No cervical adenopathy. Skin:     General: Skin is warm. Coloration: Skin is not jaundiced. Neurological:      General: No focal deficit present. Mental Status: She is alert. Mental status is at baseline. Cranial Nerves: Cranial nerves are intact. Motor: Motor function is intact. Coordination: Coordination is intact. Psychiatric:         Mood and Affect: Mood normal.         Behavior: Behavior normal.         Thought Content: Thought content normal.         Judgment: Judgment normal.       Assessment & Plan:      ICD-10-CM ICD-9-CM    1. Annual physical exam  Z00.00 V70.0       2. Essential hypertension  I10 401.9 CBC W/O DIFF      METABOLIC PANEL, COMPREHENSIVE      TSH 3RD GENERATION      TSH 3RD GENERATION      METABOLIC PANEL, COMPREHENSIVE      CBC W/O DIFF      3. Vitamin D insufficiency  E55.9 268.9 VITAMIN D, 25 HYDROXY      VITAMIN D, 25 HYDROXY      4. Pure hypercholesterolemia  E78.00 272.0 LIPID PANEL      LIPID PANEL      5. Screening for diabetes mellitus  Z13.1 V77.1 HEMOGLOBIN A1C WITH EAG      HEMOGLOBIN A1C WITH EAG        Annual Physical Exam: Reviewed and addressed patient's medical history and concerns as discussed in note. Reviewed recommended screenings and immunizations. Discussed recommendations for diet, exercise, and lifestyle. HTN: BP well controlled. Patient would like to do trial off amlodipine. Stop amlodipine and closely monitor BP. Advised calling if BP becomes persistently elevated. I have discussed the diagnosis with the patient and the intended plan as seen in the above orders. The patient has received an after-visit summary along with patient information handout. I have discussed medication side effects and warnings with the patient as well.     Disposition  Follow-up and Dispositions    Return in about 1 year (around 8/2/2023) for annual physical.           Mendoza Conteh Alejandra Alvarez MD

## 2022-08-02 NOTE — PROGRESS NOTES
Chief Complaint   Patient presents with    Hypertension    Complete Physical     1. Have you been to the ER, urgent care clinic since your last visit? Hospitalized since your last visit? No    2. Have you seen or consulted any other health care providers outside of the [address] [place] [address] since your last visit? Include any pap smears or colon screening.  No

## 2022-08-03 LAB
25(OH)D3 SERPL-MCNC: 39.1 NG/ML (ref 30–100)
ALBUMIN SERPL-MCNC: 3.4 G/DL (ref 3.5–5)
ALBUMIN/GLOB SERPL: 1 {RATIO} (ref 1.1–2.2)
ALP SERPL-CCNC: 101 U/L (ref 45–117)
ALT SERPL-CCNC: 18 U/L (ref 12–78)
ANION GAP SERPL CALC-SCNC: 3 MMOL/L (ref 5–15)
AST SERPL-CCNC: 14 U/L (ref 15–37)
BILIRUB SERPL-MCNC: 0.5 MG/DL (ref 0.2–1)
BUN SERPL-MCNC: 11 MG/DL (ref 6–20)
BUN/CREAT SERPL: 10 (ref 12–20)
CALCIUM SERPL-MCNC: 9.2 MG/DL (ref 8.5–10.1)
CHLORIDE SERPL-SCNC: 108 MMOL/L (ref 97–108)
CHOLEST SERPL-MCNC: 214 MG/DL
CO2 SERPL-SCNC: 29 MMOL/L (ref 21–32)
CREAT SERPL-MCNC: 1.07 MG/DL (ref 0.55–1.02)
ERYTHROCYTE [DISTWIDTH] IN BLOOD BY AUTOMATED COUNT: 14.1 % (ref 11.5–14.5)
EST. AVERAGE GLUCOSE BLD GHB EST-MCNC: 108 MG/DL
GLOBULIN SER CALC-MCNC: 3.5 G/DL (ref 2–4)
GLUCOSE SERPL-MCNC: 89 MG/DL (ref 65–100)
HBA1C MFR BLD: 5.4 % (ref 4–5.6)
HCT VFR BLD AUTO: 45.5 % (ref 35–47)
HDLC SERPL-MCNC: 46 MG/DL
HDLC SERPL: 4.7 {RATIO} (ref 0–5)
HGB BLD-MCNC: 14.2 G/DL (ref 11.5–16)
LDLC SERPL CALC-MCNC: 152 MG/DL (ref 0–100)
MCH RBC QN AUTO: 29.2 PG (ref 26–34)
MCHC RBC AUTO-ENTMCNC: 31.2 G/DL (ref 30–36.5)
MCV RBC AUTO: 93.4 FL (ref 80–99)
NRBC # BLD: 0 K/UL (ref 0–0.01)
NRBC BLD-RTO: 0 PER 100 WBC
PLATELET # BLD AUTO: 268 K/UL (ref 150–400)
PMV BLD AUTO: 12.3 FL (ref 8.9–12.9)
POTASSIUM SERPL-SCNC: 4.4 MMOL/L (ref 3.5–5.1)
PROT SERPL-MCNC: 6.9 G/DL (ref 6.4–8.2)
RBC # BLD AUTO: 4.87 M/UL (ref 3.8–5.2)
SODIUM SERPL-SCNC: 140 MMOL/L (ref 136–145)
TRIGL SERPL-MCNC: 80 MG/DL (ref ?–150)
TSH SERPL DL<=0.05 MIU/L-ACNC: 0.62 UIU/ML (ref 0.36–3.74)
VLDLC SERPL CALC-MCNC: 16 MG/DL
WBC # BLD AUTO: 8.3 K/UL (ref 3.6–11)

## 2022-08-04 NOTE — PROGRESS NOTES
Dear Geri Rich    I have reviewed your most recent labs, and a copy is attached. Your cholesterol and LDL (bad cholesterol) are elevated. No additional medications are recommended at this time, but we will need to keep a close eye on this. Please continue your dietary and exercise changes, and we will plan to repeat this in one year. The remainder of your labs are stable. If you have any questions, please feel free to call our office at [phone]. Sincerely,    Kristie Vargas M.D.   [facility] [address]  Phone: [phone]

## 2022-08-25 ENCOUNTER — VIRTUAL VISIT (OUTPATIENT)
Dept: FAMILY MEDICINE CLINIC | Age: 47
End: 2022-08-25
Payer: COMMERCIAL

## 2022-08-25 DIAGNOSIS — U07.1 COVID-19: Primary | ICD-10-CM

## 2022-08-25 PROCEDURE — 99213 OFFICE O/P EST LOW 20 MIN: CPT | Performed by: FAMILY MEDICINE

## 2022-08-25 RX ORDER — BENZONATATE 100 MG/1
100 CAPSULE ORAL
Qty: 15 CAPSULE | Refills: 0 | Status: SHIPPED | OUTPATIENT
Start: 2022-08-25 | End: 2022-09-01

## 2022-08-25 NOTE — PROGRESS NOTES
Gely Jackson  42 y.o. female  1975  [address] [place] [address]  [id number]     [facility]       Encounter Date: 8/25/2022           Established Patient Visit Note: Td Carrillo MD    Reason for Appointment:  Chief Complaint   Patient presents with    Positive For Covid-19         History of Present Illness:  History provided by patient    Gely Jackson is a 42 y.o. female who presents today for:     COVID 19  Duration: symptoms started last Saturday and she tested positive on Tuesday  Symptoms: cough, headache, congestion  AS: denies fever, dyspnea, or CP  Medications: taking mucinex  Course: she reports that the cough is improving and she is sleeping better at night. She reports that she is starting to feel like she is back to herself. COVID Vaccine: she had COVID vaccine and booster      Review of Systems  See HPI       Allergies: Patient has no known allergies. Medications:     Current Outpatient Medications:     benzonatate (TESSALON) 100 mg capsule, Take 1 Capsule by mouth three (3) times daily as needed for Cough for up to 7 days. , Disp: 15 Capsule, Rfl: 0    irbesartan (AVAPRO) 150 mg tablet, Take 1 Tablet by mouth nightly., Disp: 90 Tablet, Rfl: 1    buPROPion XL (WELLBUTRIN XL) 150 mg tablet, Take 1 Tablet by mouth daily. , Disp: 90 Tablet, Rfl: 1    cholecalciferol (VITAMIN D3) (1000 Units /25 mcg) tablet, Take 1 Tab by mouth daily. , Disp: , Rfl:     History  Patient Care Team:  Lucy Ross MD as PCP - General (Family Medicine)  Lucy Ross MD as PCP - [facility]  Traci Ugalde MD as Physician (Obstetrics & Gynecology)    Past Medical History: she has a past medical history of Abnormal Pap smear of cervix (2013). Past Surgical History: she has a past surgical history that includes hx wisdom teeth extraction (01/2021); hx gyn; and hx hysterectomy (11/2021).     Family Medical History: family history includes Cancer in her paternal aunt and paternal uncle; HIV/AIDS in her mother; Hypertension in her father, maternal aunt, mother, and paternal aunt; Kidney Disease in her maternal grandmother. Social History: she reports that she has never smoked. She has never used smokeless tobacco. She reports that she does not drink alcohol and does not use drugs. Objective:     Physical Exam    Constitutional:       General: Not in acute distress. Appearance: Normal appearance. Not ill-appearing,  Not toxic-appearing. HENT:      Head: Normocephalic. Right Ear: External ear normal.      Left Ear: External ear normal.      Nose: Nose normal.   Eyes:      General: No scleral icterus. Right eye: No discharge. Left eye: No discharge. Extraocular Movements: Extraocular movements intact. Conjunctiva/sclera: Conjunctivae normal.   Neck:      Musculoskeletal: Normal range of motion. Pulmonary:      Effort: Pulmonary effort is normal. No respiratory distress. Neurological:      Mental Status: Mental status is at baseline. Psychiatric:         Mood and Affect: Mood normal.         Behavior: Behavior normal.         Thought Content: Thought content normal.         Judgment: Judgment normal.       Assessment & Plan:      ICD-10-CM ICD-9-CM    1. COVID-19  U07.1 079.89 benzonatate (TESSALON) 100 mg capsule        Acute, improving. Continue symptomatic treatment. Discussed criteria for release from quarantine. Discussed red flag symptoms and reasons to call or go to ED        I was in the office while conducting this encounter. Consent:  She and/or her healthcare decision maker is aware that this patient-initiated Telehealth encounter is a billable service, with coverage as determined by her insurance carrier. She is aware that she may receive a bill and has provided verbal consent to proceed: Yes    This virtual visit was conducted via [address]  Pursuant to the emergency declaration under the [address] and the [address] authority and the Coronavirus Preparedness and [employer] Act, this Virtual  Visit was conducted to reduce the patient's risk of exposure to COVID-19 and provide continuity of care for an established patient. Services were provided through a video synchronous discussion virtually to substitute for in-person clinic visit. Due to this being a TeleHealth evaluation, many elements of the physical examination are unable to be assessed. Total Time: minutes: 21-30 minutes. I have discussed the diagnosis with the patient and the intended plan as seen in the above orders. The patient has received an after-visit summary along with patient information handout. I have discussed medication side effects and warnings with the patient as well. Disposition  Follow-up and Dispositions    Return if symptoms worsen or fail to improve, for follow up of chronic conditions.            Coby Mcadams MD

## 2022-09-29 ENCOUNTER — OFFICE VISIT (OUTPATIENT)
Dept: FAMILY MEDICINE CLINIC | Age: 47
End: 2022-09-29
Payer: COMMERCIAL

## 2022-09-29 VITALS
SYSTOLIC BLOOD PRESSURE: 129 MMHG | DIASTOLIC BLOOD PRESSURE: 84 MMHG | TEMPERATURE: 97.8 F | HEART RATE: 88 BPM | HEIGHT: 64 IN | WEIGHT: 178 LBS | BODY MASS INDEX: 30.39 KG/M2 | OXYGEN SATURATION: 98 %

## 2022-09-29 DIAGNOSIS — M22.2X1 PATELLOFEMORAL PAIN SYNDROME OF RIGHT KNEE: Primary | ICD-10-CM

## 2022-09-29 DIAGNOSIS — I10 ESSENTIAL HYPERTENSION: ICD-10-CM

## 2022-09-29 PROCEDURE — 99214 OFFICE O/P EST MOD 30 MIN: CPT | Performed by: STUDENT IN AN ORGANIZED HEALTH CARE EDUCATION/TRAINING PROGRAM

## 2022-09-29 NOTE — PROGRESS NOTES
[facility]      Chief Complaint:     Chief Complaint   Patient presents with    Knee Swelling     (Right) Pt notice sharp pain when going up and down the stairs over the last two weeks. Pt is active and works out with a . Ivett Lemus is a 42 y.o. female that presents for: knee swelling        Assessment/Plan:      Patellofemoral pain syndrome, right: may have some quad tendon strain as well that could be contributing    -Given exercises to strengthen vastus medialis and core to incorporate with   -RICE  -IF no improvement will do PT    HTN: controlled today    Follow up:      PRN    Subjective:   HPI:  Ivett Lemus is a 42 y.o. female with no significant pmhx that presents for:    Knee swelling: Right knee  +Pain. No known injuries. Front of knee has been hurting for a few weeks. Worse with walking up steps. Pain does not radiate. No fevers, chills, night sweats, recent surgeries    Health Maintenance:  Health Maintenance Due   Topic Date Due    COVID-19 Vaccine (3 - Booster for Pfizer series) 07/25/2021    Flu Vaccine (1) 08/01/2022        ROS:   See HPI      Past medical history, social history, and medications personally reviewed. Past Medical History:   Diagnosis Date    Abnormal Pap smear of cervix 2013        Allergies personally reviewed. No Known Allergies       Objective:   Vitals reviewed.   Visit Vitals  /84 (BP 1 Location: Left upper arm, BP Patient Position: Sitting, BP Cuff Size: Large adult)   Pulse 88   Temp 97.8 °F (36.6 °C) (Temporal)   Ht 5' 4\" (1.626 m)   Wt 178 lb (80.7 kg)   LMP 04/14/2021   SpO2 (!) 9%   BMI 30.55 kg/m²        Wt Readings from Last 3 Encounters:   09/29/22 178 lb (80.7 kg)   08/02/22 180 lb 6.4 oz (81.8 kg)   10/27/21 204 lb (92.5 kg)        Physical Exam  Physical Exam     Musculoskeletal:  Knee: right  Knee Effusion: None  Quadriceps atrophy: None   Popliteal (Bakers) Cyst: Negative   Patellofemoral crepitus: Negative    ROM:  Flexion: 130  Extension: 0   Hip IR/ER: FROM without pain    Dynamic Test:  Gait: Normal   Assistive devices: None       Palpation:   Patella tenderness: None  Patellar tendon tenderness: YES  Quad tendon tenderness: YES  Medial joint line tenderness: None  Lateral joint line tenderness: None  MCL tenderness: None  LCL tenderness: None  Medial facet tenderness: None  Lateral facet tenderness: None  Condyle tenderness: None  Tibia tubercle tenderness: None  Proximal fibula tenderness: None  Plica tenderness: None  Prepatellar bursa tenderness: None  Pes bursa tenderness: None  ITB tenderness: None    Ligament/Meniscal Exam:  Patellar Grind: Negative   Lochman: Negative, with good endpoint   Anterior Drawer: Negative   Posterior Drawer: Negative   Valgus stress: Negative with good endpoint   Varus stress: Negative with good endpoint   Jess: Negative   Vandana sign: Negative. Strength (0-5/5):   Flexion: Left: 5/5    Right: 5/5    Extension: Left: 5/5    Right: 5/5    Hip abduction: 5/5    Hip adduction: 5/5          I have reviewed pertinent labs results and other data. I have discussed the diagnosis with the patient and the intended plan as seen in the above orders. The patient has received an after-visit summary and questions were answered concerning future plans. I have discussed medication side effects and warnings with the patient as well.     Samuel Nelson DO  09/29/22

## 2022-09-29 NOTE — PROGRESS NOTES
Identified pt with two pt identifiers. Reviewed record in preparation for visit and have obtained necessary documentation. All patient medications has been reviewed. Chief Complaint   Patient presents with    Knee Swelling     (Right) Pt notice sharp pain when going up and down the stairs over the last two weeks. Pt is active and works out with a . Additional information about chief complaint:    Visit Vitals  /84 (BP 1 Location: Left upper arm, BP Patient Position: Sitting, BP Cuff Size: Large adult)   Pulse 88   Temp 97.8 °F (36.6 °C) (Temporal)   Ht 5' 4\" (1.626 m)   Wt 178 lb (80.7 kg)   SpO2 (!) 9%   BMI 30.55 kg/m²       Health Maintenance Due   Topic    COVID-19 Vaccine (3 - Booster for Pfizer series)    Flu Vaccine (1)       1. Have you been to the ER, urgent care clinic since your last visit? Hospitalized since your last visit? No    2. Have you seen or consulted any other health care providers outside of the [address] [place] [address] since your last visit? Include any pap smears or colon screening.  No

## 2022-09-29 NOTE — PATIENT INSTRUCTIONS
Incorporate wide stance toes out squats to strengthen vastus medialis  Banded hip thrusts and side steps to work on hip strength  Core work

## 2022-10-14 ENCOUNTER — OFFICE VISIT (OUTPATIENT)
Dept: FAMILY MEDICINE CLINIC | Age: 47
End: 2022-10-14
Payer: COMMERCIAL

## 2022-10-14 VITALS
RESPIRATION RATE: 16 BRPM | HEIGHT: 64 IN | DIASTOLIC BLOOD PRESSURE: 90 MMHG | TEMPERATURE: 98.3 F | WEIGHT: 178.4 LBS | SYSTOLIC BLOOD PRESSURE: 154 MMHG | OXYGEN SATURATION: 99 % | HEART RATE: 75 BPM | BODY MASS INDEX: 30.46 KG/M2

## 2022-10-14 DIAGNOSIS — R39.89 SUSPECTED UTI: Primary | ICD-10-CM

## 2022-10-14 DIAGNOSIS — I10 ESSENTIAL HYPERTENSION: ICD-10-CM

## 2022-10-14 LAB
BILIRUB UR QL STRIP: NEGATIVE
GLUCOSE UR-MCNC: NEGATIVE MG/DL
KETONES P FAST UR STRIP-MCNC: NEGATIVE MG/DL
PH UR STRIP: 5 [PH] (ref 4.6–8)
PROT UR QL STRIP: NEGATIVE
SP GR UR STRIP: 1.03 (ref 1–1.03)
UA UROBILINOGEN AMB POC: NORMAL (ref 0.2–1)
URINALYSIS CLARITY POC: NORMAL
URINALYSIS COLOR POC: YELLOW
URINE BLOOD POC: NORMAL
URINE LEUKOCYTES POC: NORMAL
URINE NITRITES POC: POSITIVE

## 2022-10-14 PROCEDURE — 99213 OFFICE O/P EST LOW 20 MIN: CPT | Performed by: NURSE PRACTITIONER

## 2022-10-14 PROCEDURE — 81002 URINALYSIS NONAUTO W/O SCOPE: CPT | Performed by: NURSE PRACTITIONER

## 2022-10-14 RX ORDER — NITROFURANTOIN 25; 75 MG/1; MG/1
100 CAPSULE ORAL 2 TIMES DAILY
Qty: 14 CAPSULE | Refills: 0 | Status: SHIPPED | OUTPATIENT
Start: 2022-10-14 | End: 2022-10-21

## 2022-10-14 RX ORDER — DICLOFENAC SODIUM 75 MG/1
75 TABLET, DELAYED RELEASE ORAL 2 TIMES DAILY
COMMUNITY
Start: 2022-10-13 | End: 2022-11-12

## 2022-10-14 RX ORDER — PHENAZOPYRIDINE HYDROCHLORIDE 100 MG/1
100 TABLET, FILM COATED ORAL
Qty: 10 TABLET | Refills: 0 | Status: SHIPPED | OUTPATIENT
Start: 2022-10-14 | End: 2022-10-17

## 2022-10-14 NOTE — PROGRESS NOTES
Chief Complaint   Patient presents with    UTI     Bladder pressure after urinating         1. \"Have you been to the ER, urgent care clinic since your last visit? Hospitalized since your last visit? \" Yes Where: ortho on call Reason for visit: knee pain    2. \"Have you seen or consulted any other health care providers outside of the [address] [place] [address] since your last visit? \" No     3. For patients over 45: Has the patient had a colonoscopy? Yes - Care Gap present. Most recent result on file     If the patient is female:    4. For patients over 40: Has the patient had a mammogram? Yes - no Care Gap present    5. For patients over 21: Has the patient had a pap smear?  Yes - no Care Gap present     3 most recent PHQ Screens 10/14/2022   Little interest or pleasure in doing things Not at all   Feeling down, depressed, irritable, or hopeless Not at all   Total Score PHQ 2 0   Trouble falling or staying asleep, or sleeping too much -   Feeling tired or having little energy -   Poor appetite, weight loss, or overeating -   Feeling bad about yourself - or that you are a failure or have let yourself or your family down -   Trouble concentrating on things such as school, work, reading, or watching TV -   Moving or speaking so slowly that other people could have noticed; or the opposite being so fidgety that others notice -   Thoughts of being better off dead, or hurting yourself in some way -   PHQ 9 Score -   How difficult have these problems made it for you to do your work, take care of your home and get along with others -       Health Maintenance Due   Topic Date Due    COVID-19 Vaccine (3 - Booster for Godinez Peter series) 07/25/2021    Flu Vaccine (1) 08/01/2022    Colorectal Cancer Screening Combo  10/12/2022

## 2022-10-17 LAB
BACTERIA SPEC CULT: ABNORMAL
CC UR VC: ABNORMAL
SERVICE CMNT-IMP: ABNORMAL

## 2022-10-17 NOTE — PROGRESS NOTES
[facility] Note     Néstor Chu (: 1975) is a 41 y.o. female, established patient, here for evaluation of the following chief complaint(s):  UTI (Bladder pressure after urinating)       ASSESSMENT/PLAN:  1. Suspected UTI  -     AMB POC URINALYSIS DIP STICK MANUAL W/O MICRO  -     CULTURE, URINE; Future  -     nitrofurantoin, macrocrystal-monohydrate, (MACROBID) 100 mg capsule; Take 1 Capsule by mouth two (2) times a day for 7 days. , Normal, Disp-14 Capsule, R-0  -     phenazopyridine (PYRIDIUM) 100 mg tablet; Take 1 Tablet by mouth three (3) times daily (after meals) for 3 days. , Normal, Disp-10 Tablet, R-0    2. Essential hypertension  - Asymptomatic today  -Notes recent use of diclofenac. Suspected elevation due to this?  -We will have patient return in 2 weeks for follow-up blood pressure check    BP Readings from Last 3 Encounters:   10/14/22 (!) 154/90   22 129/84   22 118/82         Return in about 2 weeks (around 10/28/2022) for blood pressure check. SUBJECTIVE/OBJECTIVE:    Néstor Chu is a 41 y.o. female seen today for suspected UTI. Urinary Symptoms  Patient complains of dysuria, frequency and bladder pressure. Onset was several days ago, unchanged since that time. Patient does not have a history of recurrent UTI. Patient does not have a history of pyelonephritis. REVIEW OF SYSTEMS:    Review of Systems   Genitourinary:  Positive for dysuria and frequency. Negative for hematuria and pelvic pain. All other systems reviewed and are negative.       VITAL SIGNS:    Wt Readings from Last 3 Encounters:   10/14/22 178 lb 6.4 oz (80.9 kg)   22 178 lb (80.7 kg)   22 180 lb 6.4 oz (81.8 kg)     Temp Readings from Last 3 Encounters:   10/14/22 98.3 °F (36.8 °C) (Temporal)   22 97.8 °F (36.6 °C) (Temporal)   22 98.1 °F (36.7 °C) (Temporal)     BP Readings from Last 3 Encounters:   10/14/22 (!) 154/90 09/29/22 129/84   08/02/22 118/82     Pulse Readings from Last 3 Encounters:   10/14/22 75   09/29/22 88   08/02/22 74           PHYSICAL EXAMINATION:       General: Alert, cooperative, no distress  Respiratory: Breathing comfortably, in no acute respiratory distress. Clear to auscultation bilaterally. Cardiovascular: Regular rate and rhythm, S1, S2 normal, no murmur, click, rub or gallop. Extremities: no edema. Abdomen: Soft, non-tender, not distended. Bowel sounds normal. No masses or organomegaly. MSK: No CVA tenderness, Extremities normal appearing, atraumatic, no effusion. Gait steady and unassisted. Skin: Skin color, texture, turgor normal. No rashes or lesions on exposed skin. Neurologic: A/Ox3  Psychiatric: Normal affect. Mood euthymic. Thoughts logical. Speech volume and speed normal            Treatment risks/benefits/costs/interactions/alternatives discussed with patient. Advised patient to call back or return to office if symptoms worsen/change/persist. If patient cannot reach us or should anything more severe/urgent arise he/she should proceed directly to the nearest emergency department. Discussed expected course/resolution/complications of diagnosis in detail with patient. Patient expressed understanding with the diagnosis and plan. An electronic signature was used to authenticate this note.   -- Miroslava Carbajal NP

## 2022-10-20 ENCOUNTER — CLINICAL SUPPORT (OUTPATIENT)
Dept: FAMILY MEDICINE CLINIC | Age: 47
End: 2022-10-20

## 2022-10-20 VITALS — SYSTOLIC BLOOD PRESSURE: 128 MMHG | DIASTOLIC BLOOD PRESSURE: 74 MMHG | TEMPERATURE: 97.7 F

## 2022-10-20 DIAGNOSIS — I10 ESSENTIAL HYPERTENSION: Primary | ICD-10-CM

## 2022-10-20 NOTE — PROGRESS NOTES
Chief Complaint   Patient presents with    Blood Pressure Check     Patient arrived for only BP check.

## 2022-11-16 ENCOUNTER — TELEPHONE (OUTPATIENT)
Dept: FAMILY MEDICINE CLINIC | Age: 47
End: 2022-11-16

## 2022-11-16 NOTE — TELEPHONE ENCOUNTER
----- Message from [id number] sent at 11/16/2022  4:05 PM EST -----  Subject: Message to Provider    QUESTIONS  Information for Provider? The patient called today to see if the office   could giver her a flu test because she thinks that she might have the flu? I did a WT to the office and spoke to Jaleel who states that the office does   not test for the flu & she would need to go to an urgent care.  ---------------------------------------------------------------------------  --------------  Abhijit Burnett [facility]  [phone]; OK to leave message on voicemail  ---------------------------------------------------------------------------  --------------  SCRIPT ANSWERS  Relationship to Patient?  Self

## 2022-11-16 NOTE — TELEPHONE ENCOUNTER
Chief Complaint   Patient presents with    Appointment     Called Patient. Name and  confirmed. Spoke with patient and patient stated that I have cough, chest congestion, body ache but no fever. Patient feels fatigue. Advised patient go to urgent care for flu test /covid test. Patient stated understanding.

## 2023-01-30 RX ORDER — IRBESARTAN 150 MG/1
150 TABLET ORAL
Qty: 90 TABLET | Refills: 1 | Status: SHIPPED | OUTPATIENT
Start: 2023-01-30

## 2023-01-30 NOTE — TELEPHONE ENCOUNTER
Last Visit: 10/14/22 Taran Wilcox  Next Appointment: 8/4/23 MD Gio Gustafson 63  Previous Refill Encounter(s): 6/7/22 90 + 1    Requested Prescriptions     Pending Prescriptions Disp Refills    irbesartan (AVAPRO) 150 mg tablet 90 Tablet 1     Sig: Take 1 Tablet by mouth nightly. For Pharmacy Admin Tracking Only    Program: Medication Refill  CPA in place:   Recommendation Provided To:    Intervention Detail: New Rx: 1, reason: Patient Preference  Intervention Accepted By:   Nolan Chacon Closed?:   Time Spent (min): 5

## 2023-03-27 RX ORDER — AMLODIPINE BESYLATE 5 MG/1
5 TABLET ORAL DAILY
Qty: 90 TABLET | Refills: 0 | Status: SHIPPED | OUTPATIENT
Start: 2023-03-27

## 2023-03-27 NOTE — TELEPHONE ENCOUNTER
MD Rissa Arriaga,    We are receiving a refill request for the amloidpine 5mg from [facility]. Medication was discontinued 8/2/22 by MD Rissa Arriaga. Sent [employer] message to patient to ask if requesting this to be refilled. Patient responded:  he told me if my blood pressure continues to spike to get back on them. I put myself back on them until it can level out again. That's why I'm requesting for a refill. RE-entered as previously prescribed if appropriate. Thanks, Ethan Sinha    Last Visit: 10/14/22 ONELIA Sow  Next Appointment: 8/4/23 Rissa Arriaga  Previous Refill Encounter(s): 6/7/22 90 + 1 (d/c'd 8/2/22)    Requested Prescriptions     Pending Prescriptions Disp Refills    amLODIPine (NORVASC) 5 mg tablet 90 Tablet 1     Sig: Take 1 Tablet by mouth daily.      For Pharmacy Admin Tracking Only    Program: Medication Refill  Intervention Detail: New Rx: 1, reason: Patient Preference  Time Spent (min): 10

## 2023-06-27 RX ORDER — AMLODIPINE BESYLATE 5 MG/1
5 TABLET ORAL DAILY
Qty: 30 TABLET | Refills: 0 | Status: SHIPPED | OUTPATIENT
Start: 2023-06-27 | End: 2023-08-04

## 2023-06-27 NOTE — TELEPHONE ENCOUNTER
Last appointment: 10/14/22 TABBY Phillips  Next appointment: 8/4/23 Jesus Mohr- Needs updated to another provider  Previous refill encounter(s): 3/27/23 90    Requested Prescriptions     Pending Prescriptions Disp Refills    amLODIPine (NORVASC) 5 MG tablet 90 tablet 0     Sig: Take 1 tablet by mouth daily     For Pharmacy Admin Tracking Only    Program: Medication Refill  Intervention Detail: New Rx: 1, reason: Patient Preference  Time Spent (min): 5

## 2023-06-28 NOTE — TELEPHONE ENCOUNTER
Left detailed message on 6.28.23 to let the pt know that her medication was approved. And to please callback the office so that we may get her established with a new Kiel Ports is no longer with the practice.

## 2023-07-31 NOTE — TELEPHONE ENCOUNTER
PCP: Merriam Carrel, MD    Last appt: 10/14/2022   No future appointments.     Requested Prescriptions     Pending Prescriptions Disp Refills    amLODIPine (NORVASC) 5 MG tablet [Pharmacy Med Name: AMLODIPINE BESYLATE 5 MG TAB] 30 tablet 0     Sig: TAKE 1 TABLET BY MOUTH EVERY DAY         Prior labs and Blood pressures:  BP Readings from Last 3 Encounters:   10/20/22 128/74   10/14/22 (!) 154/90   09/29/22 129/84     Lab Results   Component Value Date/Time     08/02/2022 11:31 AM    K 4.4 08/02/2022 11:31 AM     08/02/2022 11:31 AM    CO2 29 08/02/2022 11:31 AM    BUN 11 08/02/2022 11:31 AM    GFRAA >60 08/02/2022 11:31 AM     No results found for: HBA1C, WWP5GXDQ  Lab Results   Component Value Date/Time    CHOL 214 08/02/2022 11:31 AM    HDL 46 08/02/2022 11:31 AM     No results found for: VITD3, VD3RIA    Lab Results   Component Value Date/Time    TSH 0.62 08/02/2022 11:31 AM

## 2023-08-04 RX ORDER — AMLODIPINE BESYLATE 5 MG/1
TABLET ORAL
Qty: 30 TABLET | Refills: 0 | Status: SHIPPED | OUTPATIENT
Start: 2023-08-04

## 2023-08-29 RX ORDER — IRBESARTAN 150 MG/1
150 TABLET ORAL NIGHTLY
Qty: 30 TABLET | Refills: 0 | Status: SHIPPED | OUTPATIENT
Start: 2023-08-29

## 2023-08-29 NOTE — TELEPHONE ENCOUNTER
Last appointment: 10/14/22 Naomi Plata  Next appointment: 9/14/23 Patrica Narvaez  Previous refill encounter(s): 1/30/23 90 + 1    Requested Prescriptions     Pending Prescriptions Disp Refills    irbesartan (AVAPRO) 150 MG tablet 30 tablet 0     Sig: Take 1 tablet by mouth nightly     For Pharmacy Admin Tracking Only    Program: Medication Refill  CPA in place:    Recommendation Provided To:    Intervention Detail: New Rx: 1, reason: Patient Preference  Intervention Accepted By:   Chano Duran Closed?:    Time Spent (min): 5

## 2023-09-14 ENCOUNTER — OFFICE VISIT (OUTPATIENT)
Age: 48
End: 2023-09-14

## 2023-09-14 VITALS
TEMPERATURE: 98.3 F | DIASTOLIC BLOOD PRESSURE: 80 MMHG | RESPIRATION RATE: 16 BRPM | HEIGHT: 64 IN | HEART RATE: 85 BPM | SYSTOLIC BLOOD PRESSURE: 119 MMHG | OXYGEN SATURATION: 98 % | WEIGHT: 205.8 LBS | BODY MASS INDEX: 35.13 KG/M2

## 2023-09-14 DIAGNOSIS — E55.9 VITAMIN D DEFICIENCY: ICD-10-CM

## 2023-09-14 DIAGNOSIS — Z12.11 COLON CANCER SCREENING: ICD-10-CM

## 2023-09-14 DIAGNOSIS — E78.00 PURE HYPERCHOLESTEROLEMIA: ICD-10-CM

## 2023-09-14 DIAGNOSIS — I10 ESSENTIAL (PRIMARY) HYPERTENSION: ICD-10-CM

## 2023-09-14 DIAGNOSIS — L08.9 SKIN INFECTION: ICD-10-CM

## 2023-09-14 DIAGNOSIS — Z00.00 VISIT FOR ANNUAL HEALTH EXAMINATION: Primary | ICD-10-CM

## 2023-09-14 LAB
ALBUMIN SERPL-MCNC: 3.5 G/DL (ref 3.5–5)
ALBUMIN/GLOB SERPL: 0.9 (ref 1.1–2.2)
ALP SERPL-CCNC: 115 U/L (ref 45–117)
ALT SERPL-CCNC: 35 U/L (ref 12–78)
ANION GAP SERPL CALC-SCNC: 3 MMOL/L (ref 5–15)
AST SERPL-CCNC: 41 U/L (ref 15–37)
BILIRUB SERPL-MCNC: 0.3 MG/DL (ref 0.2–1)
BUN SERPL-MCNC: 15 MG/DL (ref 6–20)
BUN/CREAT SERPL: 13 (ref 12–20)
CALCIUM SERPL-MCNC: 9 MG/DL (ref 8.5–10.1)
CHLORIDE SERPL-SCNC: 107 MMOL/L (ref 97–108)
CHOLEST SERPL-MCNC: 213 MG/DL
CO2 SERPL-SCNC: 30 MMOL/L (ref 21–32)
CREAT SERPL-MCNC: 1.13 MG/DL (ref 0.55–1.02)
GLOBULIN SER CALC-MCNC: 4 G/DL (ref 2–4)
GLUCOSE SERPL-MCNC: 83 MG/DL (ref 65–100)
HDLC SERPL-MCNC: 52 MG/DL
HDLC SERPL: 4.1 (ref 0–5)
LDLC SERPL CALC-MCNC: 149.8 MG/DL (ref 0–100)
POTASSIUM SERPL-SCNC: 4.3 MMOL/L (ref 3.5–5.1)
PROT SERPL-MCNC: 7.5 G/DL (ref 6.4–8.2)
SODIUM SERPL-SCNC: 140 MMOL/L (ref 136–145)
TRIGL SERPL-MCNC: 56 MG/DL
VLDLC SERPL CALC-MCNC: 11.2 MG/DL

## 2023-09-14 RX ORDER — AMLODIPINE AND VALSARTAN 5; 160 MG/1; MG/1
1 TABLET ORAL DAILY
Qty: 30 TABLET | Refills: 5 | Status: SHIPPED | OUTPATIENT
Start: 2023-09-14

## 2023-09-14 RX ORDER — BUPROPION HYDROCHLORIDE 150 MG/1
150 TABLET ORAL EVERY MORNING
Qty: 30 TABLET | Refills: 5 | Status: SHIPPED | OUTPATIENT
Start: 2023-09-14

## 2023-09-14 SDOH — ECONOMIC STABILITY: FOOD INSECURITY: WITHIN THE PAST 12 MONTHS, THE FOOD YOU BOUGHT JUST DIDN'T LAST AND YOU DIDN'T HAVE MONEY TO GET MORE.: NEVER TRUE

## 2023-09-14 SDOH — ECONOMIC STABILITY: FOOD INSECURITY: WITHIN THE PAST 12 MONTHS, YOU WORRIED THAT YOUR FOOD WOULD RUN OUT BEFORE YOU GOT MONEY TO BUY MORE.: NEVER TRUE

## 2023-09-14 SDOH — ECONOMIC STABILITY: INCOME INSECURITY: HOW HARD IS IT FOR YOU TO PAY FOR THE VERY BASICS LIKE FOOD, HOUSING, MEDICAL CARE, AND HEATING?: NOT HARD AT ALL

## 2023-09-14 SDOH — ECONOMIC STABILITY: HOUSING INSECURITY
IN THE LAST 12 MONTHS, WAS THERE A TIME WHEN YOU DID NOT HAVE A STEADY PLACE TO SLEEP OR SLEPT IN A SHELTER (INCLUDING NOW)?: NO

## 2023-09-14 ASSESSMENT — PATIENT HEALTH QUESTIONNAIRE - PHQ9
SUM OF ALL RESPONSES TO PHQ9 QUESTIONS 1 & 2: 0
1. LITTLE INTEREST OR PLEASURE IN DOING THINGS: 0
SUM OF ALL RESPONSES TO PHQ QUESTIONS 1-9: 0
2. FEELING DOWN, DEPRESSED OR HOPELESS: 0

## 2023-09-15 LAB — 25(OH)D3 SERPL-MCNC: 34 NG/ML (ref 30–100)

## 2024-03-10 DIAGNOSIS — I10 ESSENTIAL (PRIMARY) HYPERTENSION: ICD-10-CM

## 2024-03-11 RX ORDER — AMLODIPINE AND VALSARTAN 5; 160 MG/1; MG/1
1 TABLET ORAL DAILY
Qty: 90 TABLET | Refills: 1 | Status: SHIPPED | OUTPATIENT
Start: 2024-03-11

## 2024-03-11 NOTE — TELEPHONE ENCOUNTER
PCP: Latha Lemus DO      Future Appointments   Date Time Provider Department Center   3/14/2024  3:40 PM Latha Lemus DO [facility]     Last seen: 9/14/2023    Requested Prescriptions     Pending Prescriptions Disp Refills    amLODIPine-valsartan (EXFORGE) 5-160 MG per tablet [Pharmacy Med Name: AMLODIPINE-VALSARTAN 5-160 MG] 90 tablet 1     Sig: TAKE 1 TABLET BY MOUTH EVERY DAY

## 2024-03-21 RX ORDER — BUPROPION HYDROCHLORIDE 150 MG/1
150 TABLET ORAL EVERY MORNING
Qty: 30 TABLET | Refills: 5 | Status: SHIPPED | OUTPATIENT
Start: 2024-03-21

## 2024-03-21 NOTE — TELEPHONE ENCOUNTER
PCP: Latha Lemus DO      Future Appointments   Date Time Provider Department Center   3/27/2024  8:40 AM Latha Lemus DO [facility] AM[facility]     Last seen: 9/14/2023    Requested Prescriptions     Pending Prescriptions Disp Refills    buPROPion (WELLBUTRIN XL) 150 MG extended release tablet [Pharmacy Med Name: BUPROPION HCL  MG TABLET] 30 tablet 5     Sig: TAKE 1 TABLET BY MOUTH EVERY DAY IN THE MORNING

## 2024-04-04 ENCOUNTER — OFFICE VISIT (OUTPATIENT)
Age: 49
End: 2024-04-04
Payer: COMMERCIAL

## 2024-04-04 VITALS
RESPIRATION RATE: 14 BRPM | HEART RATE: 83 BPM | TEMPERATURE: 98.7 F | SYSTOLIC BLOOD PRESSURE: 120 MMHG | BODY MASS INDEX: 35.55 KG/M2 | HEIGHT: 64 IN | DIASTOLIC BLOOD PRESSURE: 80 MMHG | WEIGHT: 208.2 LBS | OXYGEN SATURATION: 99 %

## 2024-04-04 DIAGNOSIS — F33.9 RECURRENT MAJOR DEPRESSIVE DISORDER, REMISSION STATUS UNSPECIFIED (HCC): ICD-10-CM

## 2024-04-04 DIAGNOSIS — F33.1 MAJOR DEPRESSIVE DISORDER, RECURRENT, MODERATE (HCC): ICD-10-CM

## 2024-04-04 DIAGNOSIS — F33.0 MAJOR DEPRESSIVE DISORDER, RECURRENT, MILD (HCC): ICD-10-CM

## 2024-04-04 DIAGNOSIS — E78.2 MIXED HYPERLIPIDEMIA: ICD-10-CM

## 2024-04-04 DIAGNOSIS — E66.01 SEVERE OBESITY (BMI 35.0-39.9) WITH COMORBIDITY (HCC): ICD-10-CM

## 2024-04-04 PROCEDURE — 3079F DIAST BP 80-89 MM HG: CPT | Performed by: STUDENT IN AN ORGANIZED HEALTH CARE EDUCATION/TRAINING PROGRAM

## 2024-04-04 PROCEDURE — 99214 OFFICE O/P EST MOD 30 MIN: CPT | Performed by: STUDENT IN AN ORGANIZED HEALTH CARE EDUCATION/TRAINING PROGRAM

## 2024-04-04 PROCEDURE — 3074F SYST BP LT 130 MM HG: CPT | Performed by: STUDENT IN AN ORGANIZED HEALTH CARE EDUCATION/TRAINING PROGRAM

## 2024-04-04 RX ORDER — BUPROPION HYDROCHLORIDE 300 MG/1
300 TABLET ORAL EVERY MORNING
Qty: 30 TABLET | Refills: 3 | Status: SHIPPED | OUTPATIENT
Start: 2024-04-04

## 2024-04-04 ASSESSMENT — PATIENT HEALTH QUESTIONNAIRE - PHQ9
SUM OF ALL RESPONSES TO PHQ QUESTIONS 1-9: 0
SUM OF ALL RESPONSES TO PHQ9 QUESTIONS 1 & 2: 0
SUM OF ALL RESPONSES TO PHQ QUESTIONS 1-9: 0
1. LITTLE INTEREST OR PLEASURE IN DOING THINGS: NOT AT ALL
2. FEELING DOWN, DEPRESSED OR HOPELESS: NOT AT ALL

## 2024-04-04 NOTE — PROGRESS NOTES
[facility] PRACT[facility]      Chief Complaint:     Chief Complaint   Patient presents with    Hypertension     6 month follow up    Weight Management     Patient states she us concerned about her weight and would like to discuss options       Felisa Bryan is a 48 y.o. female that presents for: HTN      Assessment/Plan:     Felisa was seen today for hypertension and weight management.    Diagnoses and all orders for this visit:    BMI 35.0-35.9,adult  -     buPROPion (WELLBUTRIN XL) 300 MG extended release tablet; Take 1 tablet by mouth every morning  -     Basic Metabolic Panel; Future  -     Lipid Panel; Future    Severe obesity (BMI 35.0-39.9) with comorbidity (HCC)    Major depressive disorder, recurrent, mild    Major depressive disorder, recurrent, moderate    Recurrent major depressive disorder, remission status unspecified (HCC)    Mixed hyperlipidemia  -     Lipid Panel; Future           Follow up:     Return in about 3 months (around 7/4/2024) for blood pressure check, labs beforehand.    Subjective:   HPI:  Felisa Bryan is a 48 y.o. 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 home BP have been 120s over 80s    Vitamin D insufficieny: taking Vit D 1000 international units  daily     Hx of Fibroids s/p Hysterectomy: she had hysterectomy in Nov of last year. She reports that she only has one ovary remaining because the other  had a cyst on it that had to be removed. she had hysterectomy in Nov of last year. She

## 2024-04-04 NOTE — PROGRESS NOTES
Chief Complaint   Patient presents with    Hypertension     6 month follow up     \"Have you been to the ER, urgent care clinic since your last visit?  Hospitalized since your last visit?\"    NO    “Have you seen or consulted any other health care providers outside of [facility] since your last visit?”    NO                   4/4/2024     1:54 PM   PHQ-9    Little interest or pleasure in doing things 0   Feeling down, depressed, or hopeless 0   PHQ-2 Score 0   PHQ-9 Total Score 0           Financial Resource Strain: Low Risk  (9/14/2023)    Overall Financial Resource Strain (CARDIA)     Difficulty of Paying Living Expenses: Not hard at all      Food Insecurity: Not on file (9/14/2023)          Health Maintenance Due   Topic Date Due    Hepatitis B vaccine (1 of 3 - 3-dose series) Never done    COVID-19 Vaccine (4 - 2023-24 season) 09/01/2023

## 2024-04-27 DIAGNOSIS — F33.0 MAJOR DEPRESSIVE DISORDER, RECURRENT, MILD (HCC): Primary | ICD-10-CM

## 2024-05-01 RX ORDER — BUPROPION HYDROCHLORIDE 300 MG/1
300 TABLET ORAL EVERY MORNING
Qty: 90 TABLET | Refills: 2 | Status: SHIPPED | OUTPATIENT
Start: 2024-05-01

## 2024-05-01 NOTE — TELEPHONE ENCOUNTER
PCP: Latha Lemus DO      Future Appointments   Date Time Provider Department Center   7/8/2024 11:20 AM Latha Lemus DO [facility] AM[facility]       Requested Prescriptions     Pending Prescriptions Disp Refills    buPROPion (WELLBUTRIN XL) 300 MG extended release tablet [Pharmacy Med Name: BUPROPION HCL  MG TABLET] 90 tablet 2     Sig: TAKE 1 TABLET BY MOUTH EVERY DAY IN THE MORNING     LOV: 4/4/24

## 2024-06-15 DIAGNOSIS — I10 ESSENTIAL (PRIMARY) HYPERTENSION: ICD-10-CM

## 2024-06-17 NOTE — TELEPHONE ENCOUNTER
PCP: Latha Lemus DO      Future Appointments   Date Time Provider Department Center   7/8/2024 11:20 AM Latha Lemus DO [facility]     Last seen: 4/4/2024    Requested Prescriptions     Pending Prescriptions Disp Refills    amLODIPine-valsartan (EXFORGE) 5-160 MG per tablet [Pharmacy Med Name: AMLODIPINE-VALSARTAN 5-160 MG] 90 tablet 1     Sig: TAKE 1 TABLET BY MOUTH EVERY DAY

## 2024-06-18 RX ORDER — AMLODIPINE AND VALSARTAN 5; 160 MG/1; MG/1
1 TABLET ORAL DAILY
Qty: 90 TABLET | Refills: 1 | Status: SHIPPED | OUTPATIENT
Start: 2024-06-18

## 2024-06-26 ENCOUNTER — NURSE ONLY (OUTPATIENT)
Age: 49
End: 2024-06-26

## 2024-06-26 DIAGNOSIS — E78.2 MIXED HYPERLIPIDEMIA: ICD-10-CM

## 2024-06-26 LAB
ANION GAP SERPL CALC-SCNC: 4 MMOL/L (ref 5–15)
BUN SERPL-MCNC: 13 MG/DL (ref 6–20)
BUN/CREAT SERPL: 12 (ref 12–20)
CALCIUM SERPL-MCNC: 9.1 MG/DL (ref 8.5–10.1)
CHLORIDE SERPL-SCNC: 108 MMOL/L (ref 97–108)
CHOLEST SERPL-MCNC: 207 MG/DL
CO2 SERPL-SCNC: 30 MMOL/L (ref 21–32)
CREAT SERPL-MCNC: 1.08 MG/DL (ref 0.55–1.02)
GLUCOSE SERPL-MCNC: 90 MG/DL (ref 65–100)
HDLC SERPL-MCNC: 52 MG/DL
HDLC SERPL: 4 (ref 0–5)
LDLC SERPL CALC-MCNC: 137.6 MG/DL (ref 0–100)
POTASSIUM SERPL-SCNC: 4.2 MMOL/L (ref 3.5–5.1)
SODIUM SERPL-SCNC: 142 MMOL/L (ref 136–145)
TRIGL SERPL-MCNC: 87 MG/DL
VLDLC SERPL CALC-MCNC: 17.4 MG/DL

## 2024-07-10 ENCOUNTER — TELEMEDICINE (OUTPATIENT)
Age: 49
End: 2024-07-10
Payer: COMMERCIAL

## 2024-07-10 DIAGNOSIS — I10 ESSENTIAL (PRIMARY) HYPERTENSION: ICD-10-CM

## 2024-07-10 DIAGNOSIS — F33.0 MAJOR DEPRESSIVE DISORDER, RECURRENT, MILD (HCC): ICD-10-CM

## 2024-07-10 PROCEDURE — 99214 OFFICE O/P EST MOD 30 MIN: CPT | Performed by: STUDENT IN AN ORGANIZED HEALTH CARE EDUCATION/TRAINING PROGRAM

## 2024-07-10 RX ORDER — AMLODIPINE AND VALSARTAN 5; 160 MG/1; MG/1
1 TABLET ORAL DAILY
Qty: 90 TABLET | Refills: 3 | Status: SHIPPED | OUTPATIENT
Start: 2024-07-10

## 2024-07-10 RX ORDER — BUPROPION HYDROCHLORIDE 300 MG/1
300 TABLET ORAL EVERY MORNING
Qty: 90 TABLET | Refills: 3 | Status: SHIPPED | OUTPATIENT
Start: 2024-07-10

## 2024-07-10 NOTE — PROGRESS NOTES
Felisa Bryan  48 y.o. female  1975  [address]  [place] [address]  [id number]   [facility]  Telemedicine Progress Note  Latha Lemus DO       Encounter Date and Time: July 10, 2024 at 2:38 PM    Consent:  She and/or the health care decision maker is aware that that she may receive a bill for this telephone service, depending on her insurance coverage, and has provided verbal consent to proceed: YES    Chief Complaint   Patient presents with    Follow-up     History of Present Illness   Felisa Bryan is a 48 y.o. female was evaluated by synchronous (real-time) audio-video technology from home, through the [facility] Patient Portal.    CC:  Wellbutrin helping with appetite and food cravings, has lost weight  /33> trying to do more exercise    MedHx       HLD: she is working to eat healthy and avoid greesy foods    HTN: stable- Amlodipine-valsartan 5-160mg. home BP have been improving     Vitamin D insufficieny: taking Vit D 1000 international units  daily     Hx of Fibroids s/p Hysterectomy: she had hysterectomy in Nov of last year. She reports that she only has one ovary remaining because the other  had a cyst on it that had to be removed. she had hysterectomy in Nov of last year. She reports that she only has one ovary remaining because the other  had a cyst on it that had to be removed.     Obesity: working on diet and exercises--. Wellbutrin 300mg  Knee pain: Gave info on patellofemoral pain syndrome, was going to PT , improving   Hot flashes: s/p hysterectomy ; try black cohosh         HM  COVID Vaccine: she has had 3 shots  Pap- has had hysterectomy , gets mammograms   Colonoscopy - referring today  Advised patient to get flu shot in October and COVID booster as soon as it comes out    Review of Systems   See HPI    Vitals/Objective:     General: alert, cooperative, and no distress   Mental  status: mental status: alert, oriented to person, place, and time, normal

## 2024-08-14 ENCOUNTER — HOSPITAL ENCOUNTER (EMERGENCY)
Facility: HOSPITAL | Age: 49
Discharge: HOME | End: 2024-08-14
Attending: EMERGENCY MEDICINE
Payer: COMMERCIAL

## 2024-08-14 VITALS
HEART RATE: 66 BPM | TEMPERATURE: 98.2 F | HEIGHT: 63 IN | BODY MASS INDEX: 37.03 KG/M2 | DIASTOLIC BLOOD PRESSURE: 83 MMHG | SYSTOLIC BLOOD PRESSURE: 126 MMHG | OXYGEN SATURATION: 99 % | WEIGHT: 209 LBS | RESPIRATION RATE: 20 BRPM

## 2024-08-14 DIAGNOSIS — R23.2 FACIAL FLUSHING: ICD-10-CM

## 2024-08-14 DIAGNOSIS — R03.0 ELEVATED BLOOD PRESSURE READING: Primary | ICD-10-CM

## 2024-08-14 LAB
EKG ATRIAL RATE: 73 BPM
EKG DIAGNOSIS: NORMAL
EKG P AXIS: 69 DEGREES
EKG P-R INTERVAL: 138 MS
EKG Q-T INTERVAL: 384 MS
EKG QRS DURATION: 78 MS
EKG QTC CALCULATION (BAZETT): 423 MS
EKG R AXIS: 78 DEGREES
EKG T AXIS: 25 DEGREES
EKG VENTRICULAR RATE: 73 BPM

## 2024-08-14 PROCEDURE — 93010 ELECTROCARDIOGRAM REPORT: CPT | Performed by: INTERNAL MEDICINE

## 2024-08-14 PROCEDURE — 93005 ELECTROCARDIOGRAM TRACING: CPT

## 2024-08-14 PROCEDURE — 99283 EMERGENCY DEPT VISIT LOW MDM: CPT

## 2024-08-14 ASSESSMENT — ENCOUNTER SYMPTOMS: SHORTNESS OF BREATH: 0

## 2024-08-14 ASSESSMENT — PAIN - FUNCTIONAL ASSESSMENT: PAIN_FUNCTIONAL_ASSESSMENT: NONE - DENIES PAIN

## 2024-08-14 NOTE — ED PROVIDER NOTES
[facility] EMERGENCY DEP  EMERGENCY DEPARTMENT ENCOUNTER      Pt Name: Felisa Bryan  MRN: [id number]  Birthdate 1975  Date of evaluation: 8/14/2024  Provider: Jenn Mancia PA-C    CHIEF COMPLAINT       Chief Complaint   Patient presents with    Hypertension         HISTORY OF PRESENT ILLNESS   (Location/Symptom, Timing/Onset, Context/Setting, Quality, Duration, Modifying Factors, Severity)  Note limiting factors.   Felisa Bryan is a 48 y.o. female with history of  has a past medical history of hypertension who presents via EMS to [facility] ED with cc of facial flushing following eating a breakfast sandwich.  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  She went to the school nurse who took her blood pressure and noted it to be elevated at 137/96.  The nurse subsequently took 2 more blood pressures which seems to be increasing.  The highest blood pressure was noted to be 170s over 90s.  This prompted the school nurse to call 911. Patient is going through menopause and has been having hot flashes. States this felt similar but slightly worse. Denies sick contacts. Denies fever. Denies chest pain or shortness of breath.  Took blood pressure medications as prescribed today.      PCP: No primary care provider on file.    There are no other complaints, changes or physical findings at this time.        The history is provided by the patient and the EMS personnel.         Review of External Medical Records:     Nursing Notes were reviewed.    REVIEW OF SYSTEMS    (2-9 systems for level 4, 10 or more for level 5)     Review of Systems   Respiratory:  Negative for shortness of breath.    Cardiovascular:  Negative for chest pain.       Except as noted above the remainder of the review of systems was reviewed and negative.       PAST MEDICAL HISTORY     Past Medical History:   Diagnosis Date    Abnormal Pap smear of cervix 2013    Hypertension          SURGICAL HISTORY       Past

## 2024-08-14 NOTE — DISCHARGE INSTRUCTIONS
You were seen today in the emergency department.  Your EKG showed a normal rhythm of your heart.  Your vital signs were normal including your blood pressure.  Follow-up closely with your primary care for evaluation and ongoing care.  Return to the emergency department for any new or worsening symptoms.

## 2024-08-19 ENCOUNTER — OFFICE VISIT (OUTPATIENT)
Age: 49
End: 2024-08-19

## 2024-08-19 VITALS
OXYGEN SATURATION: 98 % | TEMPERATURE: 97.7 F | WEIGHT: 206 LBS | HEIGHT: 63 IN | SYSTOLIC BLOOD PRESSURE: 120 MMHG | BODY MASS INDEX: 36.5 KG/M2 | HEART RATE: 86 BPM | DIASTOLIC BLOOD PRESSURE: 86 MMHG | RESPIRATION RATE: 18 BRPM

## 2024-08-19 DIAGNOSIS — I10 ESSENTIAL (PRIMARY) HYPERTENSION: ICD-10-CM

## 2024-08-19 RX ORDER — AMLODIPINE AND VALSARTAN 5; 160 MG/1; MG/1
1 TABLET ORAL DAILY
Qty: 90 TABLET | Refills: 3 | Status: SHIPPED | OUTPATIENT
Start: 2024-08-19 | End: 2024-08-19 | Stop reason: CLARIF

## 2024-08-19 RX ORDER — AMLODIPINE AND VALSARTAN 5; 160 MG/1; MG/1
1 TABLET ORAL DAILY
COMMUNITY

## 2024-08-19 NOTE — PATIENT INSTRUCTIONS
Thank you for visiting [facility] today.     Follow up with PCP as scheduled    If you begin to have shortness of breath, chest pain or uncontrollable fever of 100.4 or greater, please go to the Emergency Room.

## 2024-08-19 NOTE — PROGRESS NOTES
Subjective     Chief Complaint   Patient presents with    Other     Medication refill for blood pressure, PCP appointment set for September. She has been monitoring her blood pressure and its been elevated. Patient having hot flashes.        Patient is a 48-year-old female presenting with concern for blood pressure.  She states that she noticed her blood pressure is increased over the last couple of weeks.  She was in [place] 2 weeks ago for vacation and states that she did not watch what she was eating.  She states that she tries to eat fresh fruits and vegetables and drink plenty of water.  She is planning on getting back into exercise regiment today.  She reports going to primary care in about 2 to 3 weeks for follow-up.  She has been taking her medication as prescribed.          Past Medical History:   Diagnosis Date    Abnormal Pap smear of cervix 2013    Hypertension        Past Surgical History:   Procedure Laterality Date    GYN      HYSTERECTOMY (CERVIX STATUS UNKNOWN)  11/2021    PARTIAL HYSTERECTOMY (CERVIX NOT REMOVED)      WISDOM TOOTH EXTRACTION  01/2021       Family History   Problem Relation Age of Onset    Hypertension Mother     HIV/AIDS Mother     Kidney Disease Maternal Grandmother     Cancer Paternal Uncle     Hypertension Father     Hypertension Maternal Aunt     Hypertension Paternal Aunt     Cancer Paternal Aunt        No Known Allergies    Social History     Tobacco Use    Smoking status: Never    Smokeless tobacco: Never   Vaping Use    Vaping status: Never Used   Substance Use Topics    Alcohol use: No    Drug use: No       Vitals:    08/19/24 1654   BP: 120/86   Pulse:    Resp:    Temp:    SpO2:        Review of Systems   Respiratory:  Negative for shortness of breath.    Cardiovascular:  Negative for chest pain.   Neurological:  Negative for headaches.       Objective     Physical Exam  Vitals and nursing note reviewed.   Constitutional:       General: She is not in acute distress.

## 2024-08-27 ASSESSMENT — ENCOUNTER SYMPTOMS: SHORTNESS OF BREATH: 0

## 2024-09-26 ENCOUNTER — OFFICE VISIT (OUTPATIENT)
Age: 49
End: 2024-09-26

## 2024-09-26 VITALS
BODY MASS INDEX: 36.71 KG/M2 | WEIGHT: 207.2 LBS | HEIGHT: 63 IN | RESPIRATION RATE: 12 BRPM | OXYGEN SATURATION: 98 % | DIASTOLIC BLOOD PRESSURE: 83 MMHG | SYSTOLIC BLOOD PRESSURE: 117 MMHG | TEMPERATURE: 97.7 F | HEART RATE: 79 BPM

## 2024-09-26 DIAGNOSIS — F33.0 MAJOR DEPRESSIVE DISORDER, RECURRENT, MILD (HCC): ICD-10-CM

## 2024-09-26 DIAGNOSIS — E78.00 PURE HYPERCHOLESTEROLEMIA: ICD-10-CM

## 2024-09-26 DIAGNOSIS — Z76.89 ENCOUNTER TO ESTABLISH CARE: Primary | ICD-10-CM

## 2024-09-26 DIAGNOSIS — Z23 NEEDS FLU SHOT: ICD-10-CM

## 2024-09-26 DIAGNOSIS — I10 ESSENTIAL HYPERTENSION: ICD-10-CM

## 2024-09-26 RX ORDER — AMLODIPINE AND VALSARTAN 5; 160 MG/1; MG/1
1 TABLET ORAL DAILY
Qty: 90 TABLET | Refills: 1 | Status: SHIPPED | OUTPATIENT
Start: 2024-09-26

## 2024-09-26 SDOH — ECONOMIC STABILITY: INCOME INSECURITY: HOW HARD IS IT FOR YOU TO PAY FOR THE VERY BASICS LIKE FOOD, HOUSING, MEDICAL CARE, AND HEATING?: NOT HARD AT ALL

## 2024-09-26 SDOH — ECONOMIC STABILITY: FOOD INSECURITY: WITHIN THE PAST 12 MONTHS, YOU WORRIED THAT YOUR FOOD WOULD RUN OUT BEFORE YOU GOT MONEY TO BUY MORE.: NEVER TRUE

## 2024-09-26 SDOH — ECONOMIC STABILITY: FOOD INSECURITY: WITHIN THE PAST 12 MONTHS, THE FOOD YOU BOUGHT JUST DIDN'T LAST AND YOU DIDN'T HAVE MONEY TO GET MORE.: NEVER TRUE

## 2024-09-26 ASSESSMENT — PATIENT HEALTH QUESTIONNAIRE - PHQ9
SUM OF ALL RESPONSES TO PHQ9 QUESTIONS 1 & 2: 0
SUM OF ALL RESPONSES TO PHQ QUESTIONS 1-9: 0
SUM OF ALL RESPONSES TO PHQ QUESTIONS 1-9: 0
1. LITTLE INTEREST OR PLEASURE IN DOING THINGS: NOT AT ALL
SUM OF ALL RESPONSES TO PHQ QUESTIONS 1-9: 0
2. FEELING DOWN, DEPRESSED OR HOPELESS: NOT AT ALL
SUM OF ALL RESPONSES TO PHQ QUESTIONS 1-9: 0

## 2024-10-03 ENCOUNTER — OFFICE VISIT (OUTPATIENT)
Age: 49
End: 2024-10-03
Payer: COMMERCIAL

## 2024-10-03 VITALS
TEMPERATURE: 96.4 F | RESPIRATION RATE: 16 BRPM | SYSTOLIC BLOOD PRESSURE: 127 MMHG | BODY MASS INDEX: 36.39 KG/M2 | DIASTOLIC BLOOD PRESSURE: 88 MMHG | HEART RATE: 74 BPM | HEIGHT: 63 IN | WEIGHT: 205.4 LBS | OXYGEN SATURATION: 98 %

## 2024-10-03 DIAGNOSIS — J02.9 SORE THROAT: ICD-10-CM

## 2024-10-03 DIAGNOSIS — R09.89 SYMPTOMS OF UPPER RESPIRATORY INFECTION (URI): ICD-10-CM

## 2024-10-03 DIAGNOSIS — U07.1 COVID-19: Primary | ICD-10-CM

## 2024-10-03 LAB
EXP DATE SOLUTION: ABNORMAL
EXP DATE SWAB: ABNORMAL
EXPIRATION DATE: ABNORMAL
GROUP A STREP ANTIGEN, POC: NEGATIVE
LOT NUMBER POC: ABNORMAL
LOT NUMBER SOLUTION: ABNORMAL
LOT NUMBER SWAB: ABNORMAL
SARS-COV-2 RNA, POC: POSITIVE
VALID INTERNAL CONTROL, POC: NORMAL

## 2024-10-03 PROCEDURE — 99213 OFFICE O/P EST LOW 20 MIN: CPT | Performed by: NURSE PRACTITIONER

## 2024-10-03 PROCEDURE — 3074F SYST BP LT 130 MM HG: CPT | Performed by: NURSE PRACTITIONER

## 2024-10-03 PROCEDURE — 87880 STREP A ASSAY W/OPTIC: CPT | Performed by: NURSE PRACTITIONER

## 2024-10-03 PROCEDURE — 87635 SARS-COV-2 COVID-19 AMP PRB: CPT | Performed by: NURSE PRACTITIONER

## 2024-10-03 PROCEDURE — 3079F DIAST BP 80-89 MM HG: CPT | Performed by: NURSE PRACTITIONER

## 2024-10-03 ASSESSMENT — PATIENT HEALTH QUESTIONNAIRE - PHQ9
8. MOVING OR SPEAKING SO SLOWLY THAT OTHER PEOPLE COULD HAVE NOTICED. OR THE OPPOSITE, BEING SO FIGETY OR RESTLESS THAT YOU HAVE BEEN MOVING AROUND A LOT MORE THAN USUAL: NOT AT ALL
SUM OF ALL RESPONSES TO PHQ QUESTIONS 1-9: 0
4. FEELING TIRED OR HAVING LITTLE ENERGY: NOT AT ALL
9. THOUGHTS THAT YOU WOULD BE BETTER OFF DEAD, OR OF HURTING YOURSELF: NOT AT ALL
2. FEELING DOWN, DEPRESSED OR HOPELESS: NOT AT ALL
1. LITTLE INTEREST OR PLEASURE IN DOING THINGS: NOT AT ALL
SUM OF ALL RESPONSES TO PHQ QUESTIONS 1-9: 0
10. IF YOU CHECKED OFF ANY PROBLEMS, HOW DIFFICULT HAVE THESE PROBLEMS MADE IT FOR YOU TO DO YOUR WORK, TAKE CARE OF THINGS AT HOME, OR GET ALONG WITH OTHER PEOPLE: NOT DIFFICULT AT ALL
6. FEELING BAD ABOUT YOURSELF - OR THAT YOU ARE A FAILURE OR HAVE LET YOURSELF OR YOUR FAMILY DOWN: NOT AT ALL
7. TROUBLE CONCENTRATING ON THINGS, SUCH AS READING THE NEWSPAPER OR WATCHING TELEVISION: NOT AT ALL
3. TROUBLE FALLING OR STAYING ASLEEP: NOT AT ALL
SUM OF ALL RESPONSES TO PHQ9 QUESTIONS 1 & 2: 0
5. POOR APPETITE OR OVEREATING: NOT AT ALL

## 2024-10-03 ASSESSMENT — ENCOUNTER SYMPTOMS
WHEEZING: 0
TROUBLE SWALLOWING: 0
SHORTNESS OF BREATH: 0
COUGH: 1
SORE THROAT: 1
CHEST TIGHTNESS: 0
SINUS PRESSURE: 0

## 2024-10-03 NOTE — PROGRESS NOTES
Subjective:      Patient ID: Felisa Bryan is a 48 y.o. female     HPI  C/o sore throat, mild sinus congestion, headache and mild cough x 4 days.  No fever/chills.  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      Patient Active Problem List   Diagnosis    Severe obesity    Vitamin D insufficiency    Essential hypertension    Elevated serum creatinine    Pure hypercholesterolemia    Major depressive disorder, recurrent, mild    Major depressive disorder, recurrent, moderate    Major depressive disorder, recurrent, unspecified     Current Outpatient Medications   Medication Sig    amLODIPine-valsartan (EXFORGE) 5-160 MG per tablet Take 1 tablet by mouth daily     No current facility-administered medications for this visit.     Social History     Tobacco Use    Smoking status: Never    Smokeless tobacco: Never   Vaping Use    Vaping status: Never Used   Substance Use Topics    Alcohol use: No    Drug use: No     Blood pressure 127/88, pulse 74, temperature (!) 96.4 °F (35.8 °C), resp. rate 16, height 1.6 m (5' 3\"), weight 93.2 kg (205 lb 6.4 oz), SpO2 98%.    Review of Systems   Constitutional:  Negative for chills and fever.   HENT:  Positive for congestion and sore throat. Negative for ear pain, sinus pressure and trouble swallowing.    Respiratory:  Positive for cough. Negative for chest tightness, shortness of breath and wheezing.    Cardiovascular: Negative.    Neurological:  Positive for headaches.   All other systems reviewed and are negative.        Objective:   Physical Exam  Constitutional:       General: She is not in acute distress.  HENT:      Right Ear: Tympanic membrane and ear canal normal.      Left Ear: Tympanic membrane and ear canal normal.      Mouth/Throat:      Pharynx: Posterior oropharyngeal erythema present. No oropharyngeal exudate.   Cardiovascular:      Rate and Rhythm: Normal rate and regular rhythm.      Heart sounds: Normal heart sounds.   Pulmonary:      
Never done    Breast cancer screen  01/04/2023    COVID-19 Vaccine (4 - 2023-24 season) 09/01/2024

## 2024-10-30 ENCOUNTER — OFFICE VISIT (OUTPATIENT)
Age: 49
End: 2024-10-30
Payer: COMMERCIAL

## 2024-10-30 VITALS
HEIGHT: 63 IN | RESPIRATION RATE: 12 BRPM | TEMPERATURE: 97.5 F | HEART RATE: 89 BPM | OXYGEN SATURATION: 98 % | SYSTOLIC BLOOD PRESSURE: 114 MMHG | WEIGHT: 206.2 LBS | BODY MASS INDEX: 36.54 KG/M2 | DIASTOLIC BLOOD PRESSURE: 81 MMHG

## 2024-10-30 DIAGNOSIS — L73.2 HIDRADENITIS: ICD-10-CM

## 2024-10-30 DIAGNOSIS — J06.9 UPPER RESPIRATORY TRACT INFECTION, UNSPECIFIED TYPE: Primary | ICD-10-CM

## 2024-10-30 LAB
LOT EXPIRE DATE: NORMAL
LOT KIT NUMBER: NORMAL
QUICKVUE INFLUENZA TEST: NEGATIVE
SARS-COV-2, POC: NORMAL
VALID INTERNAL CONTROL, POC: NORMAL
VALID INTERNAL CONTROL: NORMAL
VENDOR AND KIT NAME POC: NORMAL

## 2024-10-30 PROCEDURE — 3074F SYST BP LT 130 MM HG: CPT | Performed by: NURSE PRACTITIONER

## 2024-10-30 PROCEDURE — 87804 INFLUENZA ASSAY W/OPTIC: CPT | Performed by: NURSE PRACTITIONER

## 2024-10-30 PROCEDURE — 99214 OFFICE O/P EST MOD 30 MIN: CPT | Performed by: NURSE PRACTITIONER

## 2024-10-30 PROCEDURE — 3079F DIAST BP 80-89 MM HG: CPT | Performed by: NURSE PRACTITIONER

## 2024-10-30 PROCEDURE — 87426 SARSCOV CORONAVIRUS AG IA: CPT | Performed by: NURSE PRACTITIONER

## 2024-10-30 RX ORDER — CLINDAMYCIN PHOSPHATE 10 UG/ML
LOTION TOPICAL
Qty: 60 G | Refills: 5 | Status: SHIPPED | OUTPATIENT
Start: 2024-10-30 | End: 2024-11-29

## 2024-10-30 NOTE — PROGRESS NOTES
Chief Complaint   Patient presents with    Arm Pain     Boils under right arm pit    Post-COVID Symptoms     Cough never went away and past couple of days feels sick again         \"Have you been to the ER, urgent care clinic since your last visit?  Hospitalized since your last visit?\"    No    “Have you seen or consulted any other health care providers outside of [facility] since your last visit?”    No            Click Here for Release of Records Request           10/3/2024     9:47 AM   PHQ-9    Little interest or pleasure in doing things 0   Feeling down, depressed, or hopeless 0   Trouble falling or staying asleep, or sleeping too much 0   Feeling tired or having little energy 0   Poor appetite or overeating 0   Feeling bad about yourself - or that you are a failure or have let yourself or your family down 0   Trouble concentrating on things, such as reading the newspaper or watching television 0   Moving or speaking so slowly that other people could have noticed. Or the opposite - being so fidgety or restless that you have been moving around a lot more than usual 0   Thoughts that you would be better off dead, or of hurting yourself in some way 0   PHQ-2 Score 0   PHQ-9 Total Score 0   If you checked off any problems, how difficult have these problems made it for you to do your work, take care of things at home, or get along with other people? 0           Financial Resource Strain: Low Risk  (9/26/2024)    Overall Financial Resource Strain (CARDIA)     Difficulty of Paying Living Expenses: Not hard at all      Food Insecurity: No Food Insecurity (9/26/2024)    Hunger Vital Sign     Worried About Running Out of Food in the Last Year: Never true     Ran Out of Food in the Last Year: Never true          Health Maintenance Due   Topic Date Due    Hepatitis B vaccine (1 of 3 - 19+ 3-dose series) Never done    COVID-19 Vaccine (4 - 2023-24 season) 09/01/2024        
risks/benefits/costs/interactions/alternatives discussed with patient.  Advised patient to call back or return to office if symptoms worsen/change/persist. If patient cannot reach us or should anything more severe/urgent arise he/she should proceed directly to the nearest emergency department.  Discussed expected course/resolution/complications of diagnosis in detail with patient.  Patient expressed understanding with the diagnosis and plan.     The patient (or guardian, if applicable) and other individuals in attendance with the patient were advised that Artificial Intelligence will be utilized during this visit to record and process the conversation to generate a clinical note. The patient (or guardian, if applicable) and other individuals in attendance at the appointment consented to the use of AI, including the recording.                    An electronic signature was used to authenticate this note.  -- FELICE Littlejohn - NP

## 2024-10-31 ENCOUNTER — PATIENT MESSAGE (OUTPATIENT)
Age: 49
End: 2024-10-31

## 2025-04-26 SDOH — ECONOMIC STABILITY: FOOD INSECURITY: WITHIN THE PAST 12 MONTHS, THE FOOD YOU BOUGHT JUST DIDN'T LAST AND YOU DIDN'T HAVE MONEY TO GET MORE.: NEVER TRUE

## 2025-04-26 SDOH — ECONOMIC STABILITY: INCOME INSECURITY: IN THE LAST 12 MONTHS, WAS THERE A TIME WHEN YOU WERE NOT ABLE TO PAY THE MORTGAGE OR RENT ON TIME?: NO

## 2025-04-26 SDOH — ECONOMIC STABILITY: TRANSPORTATION INSECURITY
IN THE PAST 12 MONTHS, HAS THE LACK OF TRANSPORTATION KEPT YOU FROM MEDICAL APPOINTMENTS OR FROM GETTING MEDICATIONS?: NO

## 2025-04-26 SDOH — ECONOMIC STABILITY: TRANSPORTATION INSECURITY
IN THE PAST 12 MONTHS, HAS LACK OF TRANSPORTATION KEPT YOU FROM MEETINGS, WORK, OR FROM GETTING THINGS NEEDED FOR DAILY LIVING?: NO

## 2025-04-26 SDOH — ECONOMIC STABILITY: FOOD INSECURITY: WITHIN THE PAST 12 MONTHS, YOU WORRIED THAT YOUR FOOD WOULD RUN OUT BEFORE YOU GOT MONEY TO BUY MORE.: NEVER TRUE

## 2025-04-28 ENCOUNTER — TELEPHONE (OUTPATIENT)
Age: 50
End: 2025-04-28

## 2025-04-28 ENCOUNTER — OFFICE VISIT (OUTPATIENT)
Age: 50
End: 2025-04-28
Payer: COMMERCIAL

## 2025-04-28 VITALS
RESPIRATION RATE: 18 BRPM | HEIGHT: 63 IN | TEMPERATURE: 97.3 F | DIASTOLIC BLOOD PRESSURE: 80 MMHG | HEART RATE: 81 BPM | OXYGEN SATURATION: 98 % | WEIGHT: 214 LBS | SYSTOLIC BLOOD PRESSURE: 114 MMHG | BODY MASS INDEX: 37.92 KG/M2

## 2025-04-28 DIAGNOSIS — E66.01 SEVERE OBESITY (BMI 35.0-39.9) WITH COMORBIDITY (HCC): Primary | ICD-10-CM

## 2025-04-28 DIAGNOSIS — I10 ESSENTIAL HYPERTENSION: ICD-10-CM

## 2025-04-28 DIAGNOSIS — Z13.1 DIABETES MELLITUS SCREENING: ICD-10-CM

## 2025-04-28 DIAGNOSIS — Z78.9 HEPATITIS B VIRUS SEROLOGIC STATUS UNKNOWN: ICD-10-CM

## 2025-04-28 DIAGNOSIS — F33.42 RECURRENT MAJOR DEPRESSIVE DISORDER, IN FULL REMISSION: ICD-10-CM

## 2025-04-28 DIAGNOSIS — E78.00 PURE HYPERCHOLESTEROLEMIA: ICD-10-CM

## 2025-04-28 DIAGNOSIS — M70.22 OLECRANON BURSITIS OF LEFT ELBOW: ICD-10-CM

## 2025-04-28 PROBLEM — F33.1 MAJOR DEPRESSIVE DISORDER, RECURRENT, MODERATE (HCC): Status: RESOLVED | Noted: 2024-04-04 | Resolved: 2025-04-28

## 2025-04-28 LAB
ALBUMIN SERPL-MCNC: 3.4 G/DL (ref 3.5–5)
ALBUMIN/GLOB SERPL: 1 (ref 1.1–2.2)
ALP SERPL-CCNC: 128 U/L (ref 45–117)
ALT SERPL-CCNC: 28 U/L (ref 12–78)
ANION GAP SERPL CALC-SCNC: 2 MMOL/L (ref 2–12)
AST SERPL-CCNC: 18 U/L (ref 15–37)
BASOPHILS # BLD: 0.1 K/UL (ref 0–0.1)
BASOPHILS NFR BLD: 0.8 % (ref 0–1)
BILIRUB SERPL-MCNC: 0.3 MG/DL (ref 0.2–1)
BUN SERPL-MCNC: 17 MG/DL (ref 6–20)
BUN/CREAT SERPL: 15 (ref 12–20)
CALCIUM SERPL-MCNC: 9.1 MG/DL (ref 8.5–10.1)
CHLORIDE SERPL-SCNC: 105 MMOL/L (ref 97–108)
CHOLEST SERPL-MCNC: 233 MG/DL
CO2 SERPL-SCNC: 30 MMOL/L (ref 21–32)
CREAT SERPL-MCNC: 1.17 MG/DL (ref 0.55–1.02)
DIFFERENTIAL METHOD BLD: ABNORMAL
EOSINOPHIL # BLD: 0.22 K/UL (ref 0–0.4)
EOSINOPHIL NFR BLD: 1.7 % (ref 0–7)
ERYTHROCYTE [DISTWIDTH] IN BLOOD BY AUTOMATED COUNT: 14.2 % (ref 11.5–14.5)
EST. AVERAGE GLUCOSE BLD GHB EST-MCNC: 114 MG/DL
GLOBULIN SER CALC-MCNC: 3.3 G/DL (ref 2–4)
GLUCOSE SERPL-MCNC: 85 MG/DL (ref 65–100)
HBA1C MFR BLD: 5.6 % (ref 4–5.6)
HBV SURFACE AB SER QL: NONREACTIVE
HBV SURFACE AB SER-ACNC: 5.45 MIU/ML
HBV SURFACE AG SER QL: <0.1 INDEX
HBV SURFACE AG SER QL: NEGATIVE
HCT VFR BLD AUTO: 45.4 % (ref 35–47)
HDLC SERPL-MCNC: 53 MG/DL
HDLC SERPL: 4.4 (ref 0–5)
HGB BLD-MCNC: 14.3 G/DL (ref 11.5–16)
IMM GRANULOCYTES # BLD AUTO: 0.08 K/UL (ref 0–0.04)
IMM GRANULOCYTES NFR BLD AUTO: 0.6 % (ref 0–0.5)
LDLC SERPL CALC-MCNC: 150.4 MG/DL (ref 0–100)
LYMPHOCYTES # BLD: 3.61 K/UL (ref 0.8–3.5)
LYMPHOCYTES NFR BLD: 28.7 % (ref 12–49)
MCH RBC QN AUTO: 27.3 PG (ref 26–34)
MCHC RBC AUTO-ENTMCNC: 31.5 G/DL (ref 30–36.5)
MCV RBC AUTO: 86.8 FL (ref 80–99)
MONOCYTES # BLD: 1.18 K/UL (ref 0–1)
MONOCYTES NFR BLD: 9.4 % (ref 5–13)
NEUTS SEG # BLD: 7.4 K/UL (ref 1.8–8)
NEUTS SEG NFR BLD: 58.8 % (ref 32–75)
NRBC # BLD: 0 K/UL (ref 0–0.01)
NRBC BLD-RTO: 0 PER 100 WBC
PLATELET # BLD AUTO: 317 K/UL (ref 150–400)
PMV BLD AUTO: 11.4 FL (ref 8.9–12.9)
POTASSIUM SERPL-SCNC: 4 MMOL/L (ref 3.5–5.1)
PROT SERPL-MCNC: 6.7 G/DL (ref 6.4–8.2)
RBC # BLD AUTO: 5.23 M/UL (ref 3.8–5.2)
SODIUM SERPL-SCNC: 137 MMOL/L (ref 136–145)
TRIGL SERPL-MCNC: 148 MG/DL
TSH SERPL DL<=0.05 MIU/L-ACNC: 2.08 UIU/ML (ref 0.36–3.74)
VLDLC SERPL CALC-MCNC: 29.6 MG/DL
WBC # BLD AUTO: 12.6 K/UL (ref 3.6–11)

## 2025-04-28 PROCEDURE — 99214 OFFICE O/P EST MOD 30 MIN: CPT | Performed by: NURSE PRACTITIONER

## 2025-04-28 PROCEDURE — 3079F DIAST BP 80-89 MM HG: CPT | Performed by: NURSE PRACTITIONER

## 2025-04-28 PROCEDURE — 3074F SYST BP LT 130 MM HG: CPT | Performed by: NURSE PRACTITIONER

## 2025-04-28 RX ORDER — AMLODIPINE AND VALSARTAN 5; 160 MG/1; MG/1
1 TABLET ORAL DAILY
Qty: 90 TABLET | Refills: 1 | Status: SHIPPED | OUTPATIENT
Start: 2025-04-28

## 2025-04-28 ASSESSMENT — PATIENT HEALTH QUESTIONNAIRE - PHQ9
7. TROUBLE CONCENTRATING ON THINGS, SUCH AS READING THE NEWSPAPER OR WATCHING TELEVISION: NOT AT ALL
3. TROUBLE FALLING OR STAYING ASLEEP: NOT AT ALL
SUM OF ALL RESPONSES TO PHQ QUESTIONS 1-9: 0
10. IF YOU CHECKED OFF ANY PROBLEMS, HOW DIFFICULT HAVE THESE PROBLEMS MADE IT FOR YOU TO DO YOUR WORK, TAKE CARE OF THINGS AT HOME, OR GET ALONG WITH OTHER PEOPLE: NOT DIFFICULT AT ALL
6. FEELING BAD ABOUT YOURSELF - OR THAT YOU ARE A FAILURE OR HAVE LET YOURSELF OR YOUR FAMILY DOWN: NOT AT ALL
4. FEELING TIRED OR HAVING LITTLE ENERGY: NOT AT ALL
SUM OF ALL RESPONSES TO PHQ QUESTIONS 1-9: 0
5. POOR APPETITE OR OVEREATING: NOT AT ALL
9. THOUGHTS THAT YOU WOULD BE BETTER OFF DEAD, OR OF HURTING YOURSELF: NOT AT ALL
8. MOVING OR SPEAKING SO SLOWLY THAT OTHER PEOPLE COULD HAVE NOTICED. OR THE OPPOSITE, BEING SO FIGETY OR RESTLESS THAT YOU HAVE BEEN MOVING AROUND A LOT MORE THAN USUAL: NOT AT ALL
1. LITTLE INTEREST OR PLEASURE IN DOING THINGS: NOT AT ALL
SUM OF ALL RESPONSES TO PHQ QUESTIONS 1-9: 0
SUM OF ALL RESPONSES TO PHQ QUESTIONS 1-9: 0
2. FEELING DOWN, DEPRESSED OR HOPELESS: NOT AT ALL

## 2025-04-28 NOTE — ASSESSMENT & PLAN NOTE
- Controlled. No dose adjustment at this time  -Diet and lifestyle modification encouraged for weight loss and chronic disease prevention/ management  Orders:    Comprehensive Metabolic Panel; Future    TSH; Future    amLODIPine-valsartan (EXFORGE) 5-160 MG per tablet; Take 1 tablet by mouth daily

## 2025-04-28 NOTE — ASSESSMENT & PLAN NOTE
Lab Results   Component Value Date    CHOL 207 (H) 06/26/2024    TRIG 87 06/26/2024    HDL 52 06/26/2024    .6 (H) 06/26/2024    VLDL 17.4 06/26/2024    CHOLHDLRATIO 4.0 06/26/2024   Orders:    Lipid Panel; Future

## 2025-04-28 NOTE — TELEPHONE ENCOUNTER
Patient has been sent the link for our pre-recorded [facility] Medical Weight Loss Orientation. Patient is required to register, view the recording, call insurance to verify benefits, then send an email to the  to schedule a consultation.

## 2025-04-28 NOTE — PROGRESS NOTES
Chief Complaint   Patient presents with    Hypertension     Follow up         \"Have you been to the ER, urgent care clinic since your last visit?  Hospitalized since your last visit?\"    Yes [facility] about a week ago for allergy reaction.    “Have you seen or consulted any other health care providers outside of [facility] since your last visit?”    Yes [facility] 3/26/25            Click Here for Release of Records Request           4/28/2025    10:43 AM   PHQ-9    Little interest or pleasure in doing things 0   Feeling down, depressed, or hopeless 0   Trouble falling or staying asleep, or sleeping too much 0   Feeling tired or having little energy 0   Poor appetite or overeating 0   Feeling bad about yourself - or that you are a failure or have let yourself or your family down 0   Trouble concentrating on things, such as reading the newspaper or watching television 0   Moving or speaking so slowly that other people could have noticed. Or the opposite - being so fidgety or restless that you have been moving around a lot more than usual 0   Thoughts that you would be better off dead, or of hurting yourself in some way 0   PHQ-2 Score 0   PHQ-9 Total Score 0   If you checked off any problems, how difficult have these problems made it for you to do your work, take care of things at home, or get along with other people? 0           Financial Resource Strain: Low Risk  (9/26/2024)    Overall Financial Resource Strain (CARDIA)     Difficulty of Paying Living Expenses: Not hard at all      Food Insecurity: No Food Insecurity (4/26/2025)    Hunger Vital Sign     Worried About Running Out of Food in the Last Year: Never true     Ran Out of Food in the Last Year: Never true          Health Maintenance Due   Topic Date Due    Hepatitis B vaccine (1 of 3 - 19+ 3-dose series) Never done    COVID-19 Vaccine (4 - 2024-25 season) 09/01/2024

## 2025-04-28 NOTE — PROGRESS NOTES
[facility]  Clinic Note     Felisa Bryan (: 1975) is a 49 y.o. female, established patient, here for evaluation of the following chief complaint(s):  Hypertension (Follow up)       ASSESSMENT/PLAN:    Assessment & Plan  Severe obesity (BMI 35.0-39.9) with comorbidity (HCC)   - Concern about weight, interested in weight loss program.  - Referral to weight loss group at [facility].  - Start diet changes and add walking.  - Discussed prednisone's potential side effects on weight and appetite. Had TWO courses of prednisone     2024 10/3/2024 10/30/2024 2025   Weight Loss Metrics       Weight - Scale 207 lbs 3 oz  205 lbs 6 oz  206 lbs 3 oz  214 lbs    BMI (Calculated) 36.8 kg/m2  36.5 kg/m2  36.6 kg/m2  38 kg/m2    Orders:    [facility] - [facility] - Artemio Brantley ([address])    Recurrent major depressive disorder, in full remission   - Off medication. Doing well.   Orders:    TSH; Future    Essential hypertension  -  Controlled. No dose adjustment at this time  -Diet and lifestyle modification encouraged for weight loss and chronic disease prevention/ management  Orders:    Comprehensive Metabolic Panel; Future    TSH; Future    amLODIPine-valsartan (EXFORGE) 5-160 MG per tablet; Take 1 tablet by mouth daily    Pure hypercholesterolemia  Lab Results   Component Value Date    CHOL 207 (H) 2024    TRIG 87 2024    HDL 52 2024    .6 (H) 2024    VLDL 17.4 2024    CHOLHDLRATIO 4.0 2024   Orders:    Lipid Panel; Future    Olecranon bursitis of left elbow   - Improved  Orders:    CBC with Auto Differential; Future    Diabetes mellitus screening  - Last A1c on file = 5.4 on 2022  Orders:    Comprehensive Metabolic Panel; Future    Hemoglobin A1C; Future    Hepatitis B virus serologic status unknown   Orders:    Hepatitis B Core Antibody, Total; Future    Hepatitis B Surface Antibody; Future    Hepatitis B Surface  BEHAVIORAL HEALTH PROGRESS NOTE    Patient Name: Aditi Omer  YOB: 1963  MRN: [id number]    Date of Appointment: 1/14/2019    Time of Session: 9:00am    Duration of Session: 50 minutes      Type of Session: Individual psychotherapy without family      People present in session (in addition to patient): None      Diagnoses: F41.1    Mental Status Exam: No problem indicated      Orientation  x4 (person, place, time, situation)      Grooming adequate grooming and hygiene      Attitude cooperative                                  Symptom Change:recent decrease                                                           Risk of harm:no risk                                  Abuse(PHYS,SEX,EMOT):None                                  Affect/Mood anxious      Thought content unremarkable      Suicidality:Denies ideation, plan, or intent.      Self-harm/dangerous to self Denies ideation, plan, or intent.      Homicidal thoughts/dangerousness Denies ideation, plan, or intent.            Changes in symptoms since last session: improved      Types of Interventions: Somatic Experiencing.     Session Summary: Aditi's father is recovering well, which is a relief in reference to her previous anxiety about his health. Aditi has noticed an intensification of her sensitivity to stressors. She does not feel confident about reducing this level of sensitivity. I guided Aditi to reflect on her own resources, most of which are sensate in nature: practicing music, diaphragmatic breathing, essential oils, Pilates, etc. Aditi has a protective part who is adamant about preparation and planning. 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       Action Plan/Recommendatio: Continue psychotherapy        Return to Care: one week          Flip Romero LCPC,  LMFT

## 2025-04-29 LAB — HBV CORE AB SERPL QL IA: NEGATIVE

## 2025-04-30 ENCOUNTER — RESULTS FOLLOW-UP (OUTPATIENT)
Age: 50
End: 2025-04-30

## 2025-04-30 DIAGNOSIS — D72.829 LEUKOCYTOSIS, UNSPECIFIED TYPE: Primary | ICD-10-CM

## 2025-04-30 DIAGNOSIS — R74.8 ELEVATED ALKALINE PHOSPHATASE LEVEL: ICD-10-CM

## 2025-05-12 ENCOUNTER — TELEPHONE (OUTPATIENT)
Age: 50
End: 2025-05-12

## 2025-05-22 ENCOUNTER — TELEPHONE (OUTPATIENT)
Age: 50
End: 2025-05-22

## 2025-06-12 ENCOUNTER — OFFICE VISIT (OUTPATIENT)
Age: 50
End: 2025-06-12
Payer: COMMERCIAL

## 2025-06-12 VITALS
OXYGEN SATURATION: 98 % | BODY MASS INDEX: 38.25 KG/M2 | RESPIRATION RATE: 16 BRPM | TEMPERATURE: 98.4 F | HEART RATE: 73 BPM | WEIGHT: 215.9 LBS | HEIGHT: 63 IN | SYSTOLIC BLOOD PRESSURE: 135 MMHG | DIASTOLIC BLOOD PRESSURE: 89 MMHG

## 2025-06-12 DIAGNOSIS — I10 ESSENTIAL HYPERTENSION: ICD-10-CM

## 2025-06-12 DIAGNOSIS — R51.9 FREQUENT HEADACHES: ICD-10-CM

## 2025-06-12 DIAGNOSIS — E78.00 PURE HYPERCHOLESTEROLEMIA: ICD-10-CM

## 2025-06-12 DIAGNOSIS — Z62.819 TRAUMA IN CHILDHOOD: ICD-10-CM

## 2025-06-12 DIAGNOSIS — R06.83 SNORING: ICD-10-CM

## 2025-06-12 DIAGNOSIS — E66.812 CLASS 2 SEVERE OBESITY DUE TO EXCESS CALORIES WITH SERIOUS COMORBIDITY AND BODY MASS INDEX (BMI) OF 38.0 TO 38.9 IN ADULT (HCC): Primary | ICD-10-CM

## 2025-06-12 DIAGNOSIS — Z13.1 SCREENING FOR DIABETES MELLITUS (DM): ICD-10-CM

## 2025-06-12 DIAGNOSIS — R40.0 DAYTIME SOMNOLENCE: ICD-10-CM

## 2025-06-12 DIAGNOSIS — G47.8 UNREFRESHED BY SLEEP: ICD-10-CM

## 2025-06-12 DIAGNOSIS — E55.9 VITAMIN D INSUFFICIENCY: ICD-10-CM

## 2025-06-12 DIAGNOSIS — E66.01 CLASS 2 SEVERE OBESITY DUE TO EXCESS CALORIES WITH SERIOUS COMORBIDITY AND BODY MASS INDEX (BMI) OF 38.0 TO 38.9 IN ADULT (HCC): Primary | ICD-10-CM

## 2025-06-12 PROCEDURE — 3075F SYST BP GE 130 - 139MM HG: CPT | Performed by: FAMILY MEDICINE

## 2025-06-12 PROCEDURE — 99203 OFFICE O/P NEW LOW 30 MIN: CPT | Performed by: FAMILY MEDICINE

## 2025-06-12 PROCEDURE — 3079F DIAST BP 80-89 MM HG: CPT | Performed by: FAMILY MEDICINE

## 2025-06-12 RX ORDER — EPINEPHRINE 0.3 MG/.3ML
INJECTION SUBCUTANEOUS
COMMUNITY
Start: 2025-04-24

## 2025-06-12 ASSESSMENT — PATIENT HEALTH QUESTIONNAIRE - PHQ9
SUM OF ALL RESPONSES TO PHQ QUESTIONS 1-9: 0
SUM OF ALL RESPONSES TO PHQ QUESTIONS 1-9: 0
1. LITTLE INTEREST OR PLEASURE IN DOING THINGS: NOT AT ALL
SUM OF ALL RESPONSES TO PHQ QUESTIONS 1-9: 0
SUM OF ALL RESPONSES TO PHQ QUESTIONS 1-9: 0
2. FEELING DOWN, DEPRESSED OR HOPELESS: NOT AT ALL

## 2025-06-12 NOTE — PROGRESS NOTES
Identified pt with two pt identifiers (name and ). Reviewed chart in preparation for visit and have obtained necessary documentation.    Felisa Bryan is a 49 y.o. female  Chief Complaint   Patient presents with    New Patient    Weight Management     /89 (BP Site: Right Upper Arm, Patient Position: Sitting, BP Cuff Size: Large Adult)   Pulse 73   Temp 98.4 °F (36.9 °C) (Oral)   Resp 16   Ht 1.6 m (5' 3\")   Wt 97.9 kg (215 lb 14.4 oz)   SpO2 98%   BMI 38.24 kg/m²     1. Have you been to the ER, urgent care clinic since your last visit?  Hospitalized since your last visit?Yes for an allergic reaction to the face    2. Have you seen or consulted any other health care providers outside of the [facility] since your last visit?  Include any pap smears or colon screening. No    BMI - 37.9  
no  Neck:  Thyromegaly or nodules?  no  Mouth: Large tongue no  Heart:  RRR  Lungs:  clear  Abdomen:     Hepatomegaly? no   Striae present? no  Skin:    Acne?  no   Hirsutism?  no   Skin tags?  no   Acanthosis Nigricans?  no  Ext:  Edema?  no      Assessment & Plan  Encounter Diagnoses   Name Primary?    Class 2 severe obesity due to excess calories with serious comorbidity and body mass index (BMI) of 38.0 to 38.9 in adult ([facility]) Yes    Snoring     Trauma in childhood     Essential hypertension     Vitamin D insufficiency     Pure hypercholesterolemia     Screening for diabetes mellitus (DM)     Unrefreshed by sleep     Daytime somnolence     Frequent headaches        1. labs reviewed w/ patient and appropriate labs ordered  2. EKG     3. Medication changes include: none    Felisa Bryan was seen today for New Patient and Weight Management       1. Class 2 severe obesity due to excess calories with serious comorbidity and body mass index (BMI) of 38.0 to 38.9 in adult ([facility])  -     Comprehensive Metabolic Panel; Future  Movement    aim for 3 group classes a week as first goal      Medsno change    EATING Plan  Start the low dorene eating plan using 2 robard meal replacements and one low carb meal    Sleep  aim for 7-8 hrs    Stress referred to therapy  2. Snoring  -     Ambulatory referral to Sleep Medicine  Eval for sleep apnea  3. Trauma in childhood  -     External Referral To Psychology  Referred to therapy to prevent self soothing with food  4. Essential hypertension  Exforge 5-160 mg daily  5. Vitamin D insufficiency  Supplement as needed based on level  6. Pure hypercholesterolemia  -     Lipid Panel; Future  lifestyle change is the treatment plan    7. Screening for diabetes mellitus (DM)  -     Hemoglobin A1C; Future  8. Unrefreshed by sleep  -     Ambulatory referral to Sleep Medicine  9. Daytime somnolence  -     Ambulatory referral to Sleep Medicine  10. Frequent headaches  -     Ambulatory referral to

## 2025-06-16 ENCOUNTER — TELEMEDICINE (OUTPATIENT)
Age: 50
End: 2025-06-16

## 2025-06-16 DIAGNOSIS — E66.01 CLASS 2 SEVERE OBESITY DUE TO EXCESS CALORIES WITH SERIOUS COMORBIDITY AND BODY MASS INDEX (BMI) OF 38.0 TO 38.9 IN ADULT (HCC): Primary | ICD-10-CM

## 2025-06-16 DIAGNOSIS — E66.812 CLASS 2 SEVERE OBESITY DUE TO EXCESS CALORIES WITH SERIOUS COMORBIDITY AND BODY MASS INDEX (BMI) OF 38.0 TO 38.9 IN ADULT (HCC): Primary | ICD-10-CM

## 2025-06-16 NOTE — PROGRESS NOTES
[facility] [place]  Metabolic Program Initial Nutrition Consult    Date: 2025   Physician: Traci Moran MD    Name: Felisa Bryan  :  1975    Type of Plan: LCD    Weeks on Plan: week 1    Consent:  Patient and/or their healthcare decision maker is aware that this patient-initiated Telehealth or audio encounter is a complementary visit as part of the comprehensive VLCD/LCD meal replacement program offered at [facility].  Patient is aware if they discontinue the meal replacement program, all future RD visits with this provider will become a billable service, with coverage as determined by their insurance carrier.  Patient has provided verbal understanding and consent to proceed: yes, confirmed    Felisa Bryan, was evaluated through a synchronous (real-time) audio-video encounter. This Virtual Visit was conducted with patient's (and/or legal guardian's) consent. Patient identification was verified, and a caregiver was present when appropriate.   The patient was located at Home: [address] [place] [id number]  Provider was located at Home (Appt Dept State): [place]  Confirm you are appropriately licensed, registered, or certified to deliver care in the state where the patient is located as indicated above. If you are not or unsure, please re-schedule the visit: Yes, I confirm.      --JANNET COPPOLA RD on 2025 at 10:04 AM    An electronic signature was used to authenticate this note.     ASSESSMENT:    Medications/Supplements:   Prior to Admission medications    Medication Sig Start Date End Date Taking? Authorizing Provider   EPINEPHrine (EPIPEN) 0.3 MG/0.3ML SOAJ injection INJECT 0.3 MG INTRAMUSCULARLY ONCE 25   Myah Torrez MD   amLODIPine-valsartan (EXFORGE) 5-160 MG per tablet Take 1 tablet by mouth daily 25   Mary Neville, APRN - NP     Anthropometrics:    Ht:63\"   Wt: 215#    IBW: 115#    %IBW: 187%     BMI:38

## 2025-06-19 ENCOUNTER — OFFICE VISIT (OUTPATIENT)
Age: 50
End: 2025-06-19

## 2025-06-19 DIAGNOSIS — E66.01 CLASS 2 SEVERE OBESITY DUE TO EXCESS CALORIES WITH SERIOUS COMORBIDITY AND BODY MASS INDEX (BMI) OF 38.0 TO 38.9 IN ADULT (HCC): Primary | ICD-10-CM

## 2025-06-19 DIAGNOSIS — E66.812 CLASS 2 SEVERE OBESITY DUE TO EXCESS CALORIES WITH SERIOUS COMORBIDITY AND BODY MASS INDEX (BMI) OF 38.0 TO 38.9 IN ADULT (HCC): Primary | ICD-10-CM

## 2025-06-25 ENCOUNTER — OFFICE VISIT (OUTPATIENT)
Age: 50
End: 2025-06-25

## 2025-06-25 DIAGNOSIS — E66.01 CLASS 2 SEVERE OBESITY DUE TO EXCESS CALORIES WITH SERIOUS COMORBIDITY AND BODY MASS INDEX (BMI) OF 38.0 TO 38.9 IN ADULT (HCC): Primary | ICD-10-CM

## 2025-06-25 DIAGNOSIS — E66.812 CLASS 2 SEVERE OBESITY DUE TO EXCESS CALORIES WITH SERIOUS COMORBIDITY AND BODY MASS INDEX (BMI) OF 38.0 TO 38.9 IN ADULT (HCC): Primary | ICD-10-CM

## 2025-06-27 ENCOUNTER — CLINICAL SUPPORT (OUTPATIENT)
Age: 50
End: 2025-06-27

## 2025-06-27 VITALS
DIASTOLIC BLOOD PRESSURE: 68 MMHG | OXYGEN SATURATION: 96 % | WEIGHT: 210.6 LBS | BODY MASS INDEX: 37.32 KG/M2 | HEIGHT: 63 IN | SYSTOLIC BLOOD PRESSURE: 125 MMHG | TEMPERATURE: 98.2 F | HEART RATE: 77 BPM | RESPIRATION RATE: 16 BRPM

## 2025-06-27 DIAGNOSIS — E66.01 CLASS 2 SEVERE OBESITY DUE TO EXCESS CALORIES WITH SERIOUS COMORBIDITY AND BODY MASS INDEX (BMI) OF 37.0 TO 37.9 IN ADULT (HCC): Primary | ICD-10-CM

## 2025-06-27 DIAGNOSIS — E66.812 CLASS 2 SEVERE OBESITY DUE TO EXCESS CALORIES WITH SERIOUS COMORBIDITY AND BODY MASS INDEX (BMI) OF 37.0 TO 37.9 IN ADULT (HCC): Primary | ICD-10-CM

## 2025-06-27 DIAGNOSIS — Z62.819 TRAUMA IN CHILDHOOD: ICD-10-CM

## 2025-06-27 DIAGNOSIS — E78.00 PURE HYPERCHOLESTEROLEMIA: ICD-10-CM

## 2025-06-27 DIAGNOSIS — R06.83 SNORING: ICD-10-CM

## 2025-06-27 DIAGNOSIS — E55.9 VITAMIN D INSUFFICIENCY: ICD-10-CM

## 2025-06-27 DIAGNOSIS — I10 ESSENTIAL HYPERTENSION: ICD-10-CM

## 2025-06-27 RX ORDER — ERGOCALCIFEROL 1.25 MG/1
50000 CAPSULE, LIQUID FILLED ORAL WEEKLY
COMMUNITY

## 2025-06-27 ASSESSMENT — PATIENT HEALTH QUESTIONNAIRE - PHQ9
5. POOR APPETITE OR OVEREATING: NOT AT ALL
1. LITTLE INTEREST OR PLEASURE IN DOING THINGS: NOT AT ALL
SUM OF ALL RESPONSES TO PHQ QUESTIONS 1-9: 0
2. FEELING DOWN, DEPRESSED OR HOPELESS: NOT AT ALL
3. TROUBLE FALLING OR STAYING ASLEEP: NOT AT ALL
9. THOUGHTS THAT YOU WOULD BE BETTER OFF DEAD, OR OF HURTING YOURSELF: NOT AT ALL
SUM OF ALL RESPONSES TO PHQ QUESTIONS 1-9: 0
SUM OF ALL RESPONSES TO PHQ QUESTIONS 1-9: 0
4. FEELING TIRED OR HAVING LITTLE ENERGY: NOT AT ALL
SUM OF ALL RESPONSES TO PHQ QUESTIONS 1-9: 0
7. TROUBLE CONCENTRATING ON THINGS, SUCH AS READING THE NEWSPAPER OR WATCHING TELEVISION: NOT AT ALL
8. MOVING OR SPEAKING SO SLOWLY THAT OTHER PEOPLE COULD HAVE NOTICED. OR THE OPPOSITE, BEING SO FIGETY OR RESTLESS THAT YOU HAVE BEEN MOVING AROUND A LOT MORE THAN USUAL: NOT AT ALL
6. FEELING BAD ABOUT YOURSELF - OR THAT YOU ARE A FAILURE OR HAVE LET YOURSELF OR YOUR FAMILY DOWN: NOT AT ALL

## 2025-06-27 NOTE — PROGRESS NOTES
Identified pt with two pt identifiers (name and ). Reviewed chart in preparation for visit and have obtained necessary documentation.    Felisa Bryan is a 49 y.o. female  Chief Complaint   Patient presents with    Weight Management     LCD/ Dean      /68 (BP Site: Right Upper Arm, Patient Position: Sitting, BP Cuff Size: Large Adult)   Pulse 77   Temp 98.2 °F (36.8 °C) (Oral)   Resp 16   Ht 1.6 m (5' 3\")   Wt 95.5 kg (210 lb 9.6 oz)   SpO2 96%   BMI 37.31 kg/m²     1. Have you been to the ER, urgent care clinic since your last visit?  Hospitalized since your last visit?no    2. Have you seen or consulted any other health care providers outside of the [facility] since your last visit?  Include any pap smears or colon screening. No    Patient entered their homework via the [employer] perry.

## 2025-06-27 NOTE — PROGRESS NOTES
[facility]  Metabolic Weight Loss Program        Patient's Name: Felisa Bryan  : 1975    This patient is a participant at [facility] and attended the weekly virtual nutrition class hosted via [employer].      Lynette Valdez, MS, RD, LDN

## 2025-06-27 NOTE — PROGRESS NOTES
[facility]  Metabolic Weight Loss Program        Patient's Name: Felisa Bryan  : 1975    This patient is a participant at [facility] and attended the weekly virtual nutrition class hosted via [employer].      Lynette Valdez, MS, RD, LDN

## 2025-06-30 DIAGNOSIS — D72.829 LEUKOCYTOSIS, UNSPECIFIED TYPE: Primary | ICD-10-CM

## 2025-06-30 DIAGNOSIS — R74.8 ELEVATED ALKALINE PHOSPHATASE LEVEL: ICD-10-CM

## 2025-07-01 NOTE — PROGRESS NOTES
Nurse note from patient's weekly VLCD / LCD / Maintenance class was reviewed.  Pertinent medical concerns were:   reviewed     BP Readings from Last 3 Encounters:   06/27/25 125/68   06/12/25 135/89   04/28/25 114/80       Failed to redirect to the [facility] the [facility].    Current Outpatient Medications   Medication Sig Dispense Refill    vitamin D (ERGOCALCIFEROL) 1.25 MG (61811 UT) CAPS capsule Take 1 capsule by mouth once a week      Ascorbic Acid (VITAMIN C PO) Take by mouth daily      EPINEPHrine (EPIPEN) 0.3 MG/0.3ML SOAJ injection INJECT 0.3 MG INTRAMUSCULARLY ONCE      amLODIPine-valsartan (EXFORGE) 5-160 MG per tablet Take 1 tablet by mouth daily 90 tablet 1     No current facility-administered medications for this visit.

## 2025-07-08 ENCOUNTER — LAB (OUTPATIENT)
Age: 50
End: 2025-07-08

## 2025-07-08 DIAGNOSIS — D72.829 LEUKOCYTOSIS, UNSPECIFIED TYPE: ICD-10-CM

## 2025-07-08 DIAGNOSIS — R74.8 ELEVATED ALKALINE PHOSPHATASE LEVEL: ICD-10-CM

## 2025-07-09 LAB
-: NORMAL
ALBUMIN SERPL-MCNC: 3.4 G/DL (ref 3.5–5)
ALBUMIN/GLOB SERPL: 1 (ref 1.1–2.2)
ALP SERPL-CCNC: 99 U/L (ref 45–117)
ALT SERPL-CCNC: 34 U/L (ref 12–78)
ANION GAP SERPL CALC-SCNC: 4 MMOL/L (ref 2–12)
AST SERPL-CCNC: 23 U/L (ref 15–37)
BASOPHILS # BLD: 0.05 K/UL (ref 0–0.1)
BASOPHILS NFR BLD: 0.5 % (ref 0–1)
BILIRUB SERPL-MCNC: 0.4 MG/DL (ref 0.2–1)
BUN SERPL-MCNC: 17 MG/DL (ref 6–20)
BUN/CREAT SERPL: 15 (ref 12–20)
CALCIUM SERPL-MCNC: 9.5 MG/DL (ref 8.5–10.1)
CHLORIDE SERPL-SCNC: 110 MMOL/L (ref 97–108)
CO2 SERPL-SCNC: 28 MMOL/L (ref 21–32)
CREAT SERPL-MCNC: 1.1 MG/DL (ref 0.55–1.02)
DIFFERENTIAL METHOD BLD: NORMAL
EOSINOPHIL # BLD: 0.26 K/UL (ref 0–0.4)
EOSINOPHIL NFR BLD: 2.8 % (ref 0–7)
ERYTHROCYTE [DISTWIDTH] IN BLOOD BY AUTOMATED COUNT: 13.4 % (ref 11.5–14.5)
GGT SERPL-CCNC: 30 U/L (ref 5–55)
GLOBULIN SER CALC-MCNC: 3.3 G/DL (ref 2–4)
GLUCOSE SERPL-MCNC: 87 MG/DL (ref 65–100)
HAV IGM SER QL: NONREACTIVE
HBV CORE IGM SER QL: NONREACTIVE
HBV SURFACE AG SER QL: 0.59 INDEX
HBV SURFACE AG SER QL: NEGATIVE
HCT VFR BLD AUTO: 44.1 % (ref 35–47)
HCV AB SER IA-ACNC: 0.04 INDEX
HCV AB SERPL QL IA: NONREACTIVE
HGB BLD-MCNC: 13.9 G/DL (ref 11.5–16)
IMM GRANULOCYTES # BLD AUTO: 0.01 K/UL (ref 0–0.04)
IMM GRANULOCYTES NFR BLD AUTO: 0.1 % (ref 0–0.5)
LYMPHOCYTES # BLD: 2.13 K/UL (ref 0.8–3.5)
LYMPHOCYTES NFR BLD: 23.1 % (ref 12–49)
MCH RBC QN AUTO: 27.6 PG (ref 26–34)
MCHC RBC AUTO-ENTMCNC: 31.5 G/DL (ref 30–36.5)
MCV RBC AUTO: 87.7 FL (ref 80–99)
MONOCYTES # BLD: 0.71 K/UL (ref 0–1)
MONOCYTES NFR BLD: 7.7 % (ref 5–13)
NEUTS SEG # BLD: 6.05 K/UL (ref 1.8–8)
NEUTS SEG NFR BLD: 65.8 % (ref 32–75)
NRBC # BLD: 0 K/UL (ref 0–0.01)
NRBC BLD-RTO: 0 PER 100 WBC
PLATELET # BLD AUTO: 285 K/UL (ref 150–400)
PMV BLD AUTO: 11.7 FL (ref 8.9–12.9)
POTASSIUM SERPL-SCNC: 4.3 MMOL/L (ref 3.5–5.1)
PROT SERPL-MCNC: 6.7 G/DL (ref 6.4–8.2)
RBC # BLD AUTO: 5.03 M/UL (ref 3.8–5.2)
SODIUM SERPL-SCNC: 142 MMOL/L (ref 136–145)
WBC # BLD AUTO: 9.2 K/UL (ref 3.6–11)

## 2025-07-10 LAB
MITOCHONDRIA M2 IGG SER-ACNC: <20 UNITS (ref 0–20)
SMA IGG SER-ACNC: 28 UNITS (ref 0–19)

## 2025-07-11 ENCOUNTER — CLINICAL SUPPORT (OUTPATIENT)
Age: 50
End: 2025-07-11

## 2025-07-11 VITALS
DIASTOLIC BLOOD PRESSURE: 85 MMHG | HEIGHT: 63 IN | TEMPERATURE: 98.6 F | OXYGEN SATURATION: 97 % | HEART RATE: 71 BPM | SYSTOLIC BLOOD PRESSURE: 123 MMHG | BODY MASS INDEX: 36.84 KG/M2 | WEIGHT: 207.9 LBS | RESPIRATION RATE: 14 BRPM

## 2025-07-11 DIAGNOSIS — I10 ESSENTIAL HYPERTENSION: ICD-10-CM

## 2025-07-11 DIAGNOSIS — E78.00 PURE HYPERCHOLESTEROLEMIA: ICD-10-CM

## 2025-07-11 DIAGNOSIS — Z62.819 TRAUMA IN CHILDHOOD: ICD-10-CM

## 2025-07-11 DIAGNOSIS — E66.812 CLASS 2 SEVERE OBESITY DUE TO EXCESS CALORIES WITH SERIOUS COMORBIDITY AND BODY MASS INDEX (BMI) OF 36.0 TO 36.9 IN ADULT (HCC): Primary | ICD-10-CM

## 2025-07-11 DIAGNOSIS — E55.9 VITAMIN D INSUFFICIENCY: ICD-10-CM

## 2025-07-11 DIAGNOSIS — E66.01 CLASS 2 SEVERE OBESITY DUE TO EXCESS CALORIES WITH SERIOUS COMORBIDITY AND BODY MASS INDEX (BMI) OF 36.0 TO 36.9 IN ADULT (HCC): Primary | ICD-10-CM

## 2025-07-11 DIAGNOSIS — R06.83 SNORING: ICD-10-CM

## 2025-07-11 ASSESSMENT — PATIENT HEALTH QUESTIONNAIRE - PHQ9
6. FEELING BAD ABOUT YOURSELF - OR THAT YOU ARE A FAILURE OR HAVE LET YOURSELF OR YOUR FAMILY DOWN: NOT AT ALL
SUM OF ALL RESPONSES TO PHQ QUESTIONS 1-9: 0
5. POOR APPETITE OR OVEREATING: NOT AT ALL
SUM OF ALL RESPONSES TO PHQ QUESTIONS 1-9: 0
8. MOVING OR SPEAKING SO SLOWLY THAT OTHER PEOPLE COULD HAVE NOTICED. OR THE OPPOSITE, BEING SO FIGETY OR RESTLESS THAT YOU HAVE BEEN MOVING AROUND A LOT MORE THAN USUAL: NOT AT ALL
9. THOUGHTS THAT YOU WOULD BE BETTER OFF DEAD, OR OF HURTING YOURSELF: NOT AT ALL
2. FEELING DOWN, DEPRESSED OR HOPELESS: NOT AT ALL
7. TROUBLE CONCENTRATING ON THINGS, SUCH AS READING THE NEWSPAPER OR WATCHING TELEVISION: NOT AT ALL
4. FEELING TIRED OR HAVING LITTLE ENERGY: NOT AT ALL
1. LITTLE INTEREST OR PLEASURE IN DOING THINGS: NOT AT ALL
3. TROUBLE FALLING OR STAYING ASLEEP: NOT AT ALL
SUM OF ALL RESPONSES TO PHQ QUESTIONS 1-9: 0
SUM OF ALL RESPONSES TO PHQ QUESTIONS 1-9: 0

## 2025-07-11 NOTE — PROGRESS NOTES
Identified pt with two pt identifiers (name and ). Reviewed chart in preparation for visit and have obtained necessary documentation.    Felisa Bryan is a 49 y.o. female  Chief Complaint   Patient presents with    Weight Management     LCD/ Dean      /85 (BP Site: Right Lower Arm, Patient Position: Sitting, BP Cuff Size: Large Adult)   Pulse 71   Temp 98.6 °F (37 °C) (Oral)   Resp 14   Ht 1.6 m (5' 3\")   Wt 94.3 kg (207 lb 14.4 oz)   SpO2 97%   BMI 36.83 kg/m²     1. Have you been to the ER, urgent care clinic since your last visit?  Hospitalized since your last visit?no    2. Have you seen or consulted any other health care providers outside of the [facility] since your last visit?  Include any pap smears or colon screening. no    Patient entered their homework via the [employer] perry.

## 2025-07-16 ENCOUNTER — OFFICE VISIT (OUTPATIENT)
Age: 50
End: 2025-07-16

## 2025-07-16 DIAGNOSIS — E66.01 CLASS 2 SEVERE OBESITY DUE TO EXCESS CALORIES WITH SERIOUS COMORBIDITY AND BODY MASS INDEX (BMI) OF 36.0 TO 36.9 IN ADULT (HCC): Primary | ICD-10-CM

## 2025-07-16 DIAGNOSIS — E66.812 CLASS 2 SEVERE OBESITY DUE TO EXCESS CALORIES WITH SERIOUS COMORBIDITY AND BODY MASS INDEX (BMI) OF 36.0 TO 36.9 IN ADULT (HCC): Primary | ICD-10-CM

## 2025-07-18 NOTE — PROGRESS NOTES
Nurse note from patient's weekly LCD / Maintenance class was reviewed.  Pertinent medical concerns were:   reviewed     BP Readings from Last 3 Encounters:   07/11/25 123/85   06/27/25 125/68   06/12/25 135/89       Failed to redirect to the Timeline version of the [facility] SmartLink.    Current Outpatient Medications   Medication Sig Dispense Refill    vitamin D (ERGOCALCIFEROL) 1.25 MG (86437 UT) CAPS capsule Take 1 capsule by mouth once a week      Ascorbic Acid (VITAMIN C PO) Take by mouth daily      EPINEPHrine (EPIPEN) 0.3 MG/0.3ML SOAJ injection INJECT 0.3 MG INTRAMUSCULARLY ONCE      amLODIPine-valsartan (EXFORGE) 5-160 MG per tablet Take 1 tablet by mouth daily 90 tablet 1     No current facility-administered medications for this visit.

## 2025-07-22 NOTE — PROGRESS NOTES
[facility]  Metabolic Weight Loss Program        Patient's Name: Felisa Bryan  : 1975    This patient is a participant at [facility] and attended the weekly virtual nutrition class hosted via [employer].      Lynette Valdez, MS, RD, LDN

## 2025-07-23 ENCOUNTER — CLINICAL SUPPORT (OUTPATIENT)
Age: 50
End: 2025-07-23

## 2025-07-23 VITALS
HEART RATE: 63 BPM | RESPIRATION RATE: 16 BRPM | OXYGEN SATURATION: 97 % | WEIGHT: 205.7 LBS | SYSTOLIC BLOOD PRESSURE: 124 MMHG | TEMPERATURE: 98 F | HEIGHT: 63 IN | DIASTOLIC BLOOD PRESSURE: 85 MMHG | BODY MASS INDEX: 36.45 KG/M2

## 2025-07-23 DIAGNOSIS — E78.00 PURE HYPERCHOLESTEROLEMIA: ICD-10-CM

## 2025-07-23 DIAGNOSIS — R06.83 SNORING: ICD-10-CM

## 2025-07-23 DIAGNOSIS — Z62.819 TRAUMA IN CHILDHOOD: ICD-10-CM

## 2025-07-23 DIAGNOSIS — E66.812 CLASS 2 SEVERE OBESITY DUE TO EXCESS CALORIES WITH SERIOUS COMORBIDITY AND BODY MASS INDEX (BMI) OF 36.0 TO 36.9 IN ADULT (HCC): Primary | ICD-10-CM

## 2025-07-23 DIAGNOSIS — E55.9 VITAMIN D INSUFFICIENCY: ICD-10-CM

## 2025-07-23 DIAGNOSIS — E66.01 CLASS 2 SEVERE OBESITY DUE TO EXCESS CALORIES WITH SERIOUS COMORBIDITY AND BODY MASS INDEX (BMI) OF 36.0 TO 36.9 IN ADULT (HCC): Primary | ICD-10-CM

## 2025-07-23 DIAGNOSIS — I10 ESSENTIAL HYPERTENSION: ICD-10-CM

## 2025-07-23 ASSESSMENT — PATIENT HEALTH QUESTIONNAIRE - PHQ9
2. FEELING DOWN, DEPRESSED OR HOPELESS: NOT AT ALL
SUM OF ALL RESPONSES TO PHQ QUESTIONS 1-9: 0
SUM OF ALL RESPONSES TO PHQ QUESTIONS 1-9: 0
1. LITTLE INTEREST OR PLEASURE IN DOING THINGS: NOT AT ALL
SUM OF ALL RESPONSES TO PHQ QUESTIONS 1-9: 0
SUM OF ALL RESPONSES TO PHQ QUESTIONS 1-9: 0

## 2025-07-23 NOTE — PROGRESS NOTES
Identified pt with two pt identifiers (name and ). Reviewed chart in preparation for visit and have obtained necessary documentation.    Felisa Bryan is a 49 y.o. female  Chief Complaint   Patient presents with    Weight Management     /85 (BP Site: Right Upper Arm, Patient Position: Sitting, BP Cuff Size: Large Adult)   Pulse 63   Temp 98 °F (36.7 °C) (Oral)   Resp 16   Ht 1.6 m (5' 3\")   Wt 93.3 kg (205 lb 11.2 oz)   SpO2 97%   BMI 36.44 kg/m²     1. Have you been to the ER, urgent care clinic since your last visit?  Hospitalized since your last visit?no    2. Have you seen or consulted any other health care providers [facility] your last visit?  Include any pap smears or colon screening. No    Patient entered their homework via the [employer] perry.

## 2025-07-24 ENCOUNTER — OFFICE VISIT (OUTPATIENT)
Age: 50
End: 2025-07-24

## 2025-07-24 DIAGNOSIS — E66.01 CLASS 2 SEVERE OBESITY DUE TO EXCESS CALORIES WITH SERIOUS COMORBIDITY AND BODY MASS INDEX (BMI) OF 36.0 TO 36.9 IN ADULT (HCC): Primary | ICD-10-CM

## 2025-07-24 DIAGNOSIS — E66.812 CLASS 2 SEVERE OBESITY DUE TO EXCESS CALORIES WITH SERIOUS COMORBIDITY AND BODY MASS INDEX (BMI) OF 36.0 TO 36.9 IN ADULT (HCC): Primary | ICD-10-CM

## 2025-07-30 NOTE — PROGRESS NOTES
Nurse note from patient's weekly  LCD / Maintenance class was reviewed.  Pertinent medical concerns were:   reviewed     BP Readings from Last 3 Encounters:   07/23/25 124/85   07/11/25 123/85   06/27/25 125/68       Failed to redirect to the Timeline version of the REVFS SmartLink.    Current Outpatient Medications   Medication Sig Dispense Refill    vitamin D (ERGOCALCIFEROL) 1.25 MG (78727 UT) CAPS capsule Take 1 capsule by mouth once a week      Ascorbic Acid (VITAMIN C PO) Take by mouth daily      EPINEPHrine (EPIPEN) 0.3 MG/0.3ML SOAJ injection INJECT 0.3 MG INTRAMUSCULARLY ONCE      amLODIPine-valsartan (EXFORGE) 5-160 MG per tablet Take 1 tablet by mouth daily 90 tablet 1     No current facility-administered medications for this visit.

## 2025-07-31 ENCOUNTER — OFFICE VISIT (OUTPATIENT)
Age: 50
End: 2025-07-31

## 2025-07-31 DIAGNOSIS — E66.812 CLASS 2 SEVERE OBESITY DUE TO EXCESS CALORIES WITH SERIOUS COMORBIDITY AND BODY MASS INDEX (BMI) OF 36.0 TO 36.9 IN ADULT (HCC): Primary | ICD-10-CM

## 2025-07-31 DIAGNOSIS — E66.01 CLASS 2 SEVERE OBESITY DUE TO EXCESS CALORIES WITH SERIOUS COMORBIDITY AND BODY MASS INDEX (BMI) OF 36.0 TO 36.9 IN ADULT (HCC): Primary | ICD-10-CM

## 2025-07-31 NOTE — PROGRESS NOTES
[facility]  Metabolic Weight Loss Program        Patient's Name: Felisa Bryan  : 1975    This patient is a participant at [facility] and attended the weekly virtual nutrition class hosted via [employer].      Lynette Valdez, MS, RD, LDN

## 2025-08-01 LAB
ALBUMIN SERPL-MCNC: 3.7 G/DL (ref 3.9–4.9)
ALP SERPL-CCNC: 98 IU/L (ref 44–121)
ALT SERPL-CCNC: 17 IU/L (ref 0–32)
AST SERPL-CCNC: 19 IU/L (ref 0–40)
BILIRUB SERPL-MCNC: 0.3 MG/DL (ref 0–1.2)
BUN SERPL-MCNC: 17 MG/DL (ref 6–24)
BUN/CREAT SERPL: 18 (ref 9–23)
CALCIUM SERPL-MCNC: 9.6 MG/DL (ref 8.7–10.2)
CHLORIDE SERPL-SCNC: 106 MMOL/L (ref 96–106)
CHOLEST SERPL-MCNC: 178 MG/DL (ref 100–199)
CO2 SERPL-SCNC: 21 MMOL/L (ref 20–29)
CREAT SERPL-MCNC: 0.92 MG/DL (ref 0.57–1)
EGFRCR SERPLBLD CKD-EPI 2021: 77 ML/MIN/1.73
GLOBULIN SER CALC-MCNC: 3.1 G/DL (ref 1.5–4.5)
GLUCOSE SERPL-MCNC: 94 MG/DL (ref 70–99)
HBA1C MFR BLD: 5.9 % (ref 4.8–5.6)
HDLC SERPL-MCNC: 38 MG/DL
LDLC SERPL CALC-MCNC: 127 MG/DL (ref 0–99)
POTASSIUM SERPL-SCNC: 4.5 MMOL/L (ref 3.5–5.2)
PROT SERPL-MCNC: 6.8 G/DL (ref 6–8.5)
SODIUM SERPL-SCNC: 142 MMOL/L (ref 134–144)
TRIGL SERPL-MCNC: 66 MG/DL (ref 0–149)
VLDLC SERPL CALC-MCNC: 13 MG/DL (ref 5–40)

## 2025-08-07 ENCOUNTER — TELEMEDICINE (OUTPATIENT)
Age: 50
End: 2025-08-07

## 2025-08-07 ENCOUNTER — OFFICE VISIT (OUTPATIENT)
Age: 50
End: 2025-08-07
Payer: COMMERCIAL

## 2025-08-07 VITALS
HEART RATE: 60 BPM | OXYGEN SATURATION: 90 % | RESPIRATION RATE: 18 BRPM | WEIGHT: 203.2 LBS | BODY MASS INDEX: 36 KG/M2 | HEIGHT: 63 IN | DIASTOLIC BLOOD PRESSURE: 86 MMHG | TEMPERATURE: 98.6 F | SYSTOLIC BLOOD PRESSURE: 133 MMHG

## 2025-08-07 DIAGNOSIS — E66.812 CLASS 2 SEVERE OBESITY DUE TO EXCESS CALORIES WITH SERIOUS COMORBIDITY AND BODY MASS INDEX (BMI) OF 36.0 TO 36.9 IN ADULT (HCC): Primary | ICD-10-CM

## 2025-08-07 DIAGNOSIS — I10 ESSENTIAL HYPERTENSION: ICD-10-CM

## 2025-08-07 DIAGNOSIS — E55.9 VITAMIN D INSUFFICIENCY: ICD-10-CM

## 2025-08-07 DIAGNOSIS — R06.83 SNORING: ICD-10-CM

## 2025-08-07 DIAGNOSIS — E66.01 CLASS 2 SEVERE OBESITY DUE TO EXCESS CALORIES WITH SERIOUS COMORBIDITY AND BODY MASS INDEX (BMI) OF 36.0 TO 36.9 IN ADULT (HCC): Primary | ICD-10-CM

## 2025-08-07 DIAGNOSIS — Z62.819 TRAUMA IN CHILDHOOD: ICD-10-CM

## 2025-08-07 DIAGNOSIS — E78.00 PURE HYPERCHOLESTEROLEMIA: ICD-10-CM

## 2025-08-07 PROCEDURE — 3079F DIAST BP 80-89 MM HG: CPT | Performed by: FAMILY MEDICINE

## 2025-08-07 PROCEDURE — 99214 OFFICE O/P EST MOD 30 MIN: CPT | Performed by: FAMILY MEDICINE

## 2025-08-07 PROCEDURE — 3075F SYST BP GE 130 - 139MM HG: CPT | Performed by: FAMILY MEDICINE

## 2025-08-07 ASSESSMENT — PATIENT HEALTH QUESTIONNAIRE - PHQ9
SUM OF ALL RESPONSES TO PHQ QUESTIONS 1-9: 0
2. FEELING DOWN, DEPRESSED OR HOPELESS: NOT AT ALL
SUM OF ALL RESPONSES TO PHQ QUESTIONS 1-9: 0
SUM OF ALL RESPONSES TO PHQ QUESTIONS 1-9: 0
1. LITTLE INTEREST OR PLEASURE IN DOING THINGS: NOT AT ALL
SUM OF ALL RESPONSES TO PHQ QUESTIONS 1-9: 0

## 2025-08-21 ENCOUNTER — OFFICE VISIT (OUTPATIENT)
Age: 50
End: 2025-08-21

## 2025-08-21 ENCOUNTER — CLINICAL SUPPORT (OUTPATIENT)
Age: 50
End: 2025-08-21

## 2025-08-21 VITALS
RESPIRATION RATE: 16 BRPM | SYSTOLIC BLOOD PRESSURE: 124 MMHG | HEIGHT: 63 IN | WEIGHT: 199.7 LBS | TEMPERATURE: 98.9 F | OXYGEN SATURATION: 97 % | HEART RATE: 63 BPM | DIASTOLIC BLOOD PRESSURE: 85 MMHG | BODY MASS INDEX: 35.38 KG/M2

## 2025-08-21 DIAGNOSIS — E66.812 CLASS 2 SEVERE OBESITY DUE TO EXCESS CALORIES WITH SERIOUS COMORBIDITY AND BODY MASS INDEX (BMI) OF 36.0 TO 36.9 IN ADULT (HCC): Primary | ICD-10-CM

## 2025-08-21 DIAGNOSIS — I10 ESSENTIAL HYPERTENSION: ICD-10-CM

## 2025-08-21 DIAGNOSIS — E55.9 VITAMIN D INSUFFICIENCY: ICD-10-CM

## 2025-08-21 DIAGNOSIS — Z62.819 TRAUMA IN CHILDHOOD: ICD-10-CM

## 2025-08-21 DIAGNOSIS — E66.01 CLASS 2 SEVERE OBESITY DUE TO EXCESS CALORIES WITH SERIOUS COMORBIDITY AND BODY MASS INDEX (BMI) OF 36.0 TO 36.9 IN ADULT (HCC): Primary | ICD-10-CM

## 2025-08-21 RX ORDER — DICLOFENAC SODIUM 75 MG/1
75 TABLET, DELAYED RELEASE ORAL 2 TIMES DAILY
COMMUNITY
Start: 2025-08-13

## 2025-08-21 ASSESSMENT — PATIENT HEALTH QUESTIONNAIRE - PHQ9
SUM OF ALL RESPONSES TO PHQ QUESTIONS 1-9: 0
2. FEELING DOWN, DEPRESSED OR HOPELESS: NOT AT ALL
SUM OF ALL RESPONSES TO PHQ QUESTIONS 1-9: 0
1. LITTLE INTEREST OR PLEASURE IN DOING THINGS: NOT AT ALL

## 2025-09-04 ENCOUNTER — CLINICAL SUPPORT (OUTPATIENT)
Age: 50
End: 2025-09-04

## 2025-09-04 VITALS
DIASTOLIC BLOOD PRESSURE: 84 MMHG | WEIGHT: 197.8 LBS | TEMPERATURE: 98.7 F | HEIGHT: 63 IN | OXYGEN SATURATION: 98 % | HEART RATE: 77 BPM | RESPIRATION RATE: 16 BRPM | BODY MASS INDEX: 35.05 KG/M2 | SYSTOLIC BLOOD PRESSURE: 126 MMHG

## 2025-09-04 DIAGNOSIS — E66.01 CLASS 2 SEVERE OBESITY DUE TO EXCESS CALORIES WITH SERIOUS COMORBIDITY AND BODY MASS INDEX (BMI) OF 35.0 TO 35.9 IN ADULT (HCC): Primary | ICD-10-CM

## 2025-09-04 DIAGNOSIS — Z62.819 TRAUMA IN CHILDHOOD: ICD-10-CM

## 2025-09-04 DIAGNOSIS — E66.812 CLASS 2 SEVERE OBESITY DUE TO EXCESS CALORIES WITH SERIOUS COMORBIDITY AND BODY MASS INDEX (BMI) OF 35.0 TO 35.9 IN ADULT (HCC): Primary | ICD-10-CM

## 2025-09-04 DIAGNOSIS — E55.9 VITAMIN D INSUFFICIENCY: ICD-10-CM

## 2025-09-04 DIAGNOSIS — I10 ESSENTIAL HYPERTENSION: ICD-10-CM

## 2025-09-04 DIAGNOSIS — E78.00 PURE HYPERCHOLESTEROLEMIA: ICD-10-CM

## 2025-09-04 ASSESSMENT — PATIENT HEALTH QUESTIONNAIRE - PHQ9
7. TROUBLE CONCENTRATING ON THINGS, SUCH AS READING THE NEWSPAPER OR WATCHING TELEVISION: NOT AT ALL
8. MOVING OR SPEAKING SO SLOWLY THAT OTHER PEOPLE COULD HAVE NOTICED. OR THE OPPOSITE, BEING SO FIGETY OR RESTLESS THAT YOU HAVE BEEN MOVING AROUND A LOT MORE THAN USUAL: NOT AT ALL
2. FEELING DOWN, DEPRESSED OR HOPELESS: NOT AT ALL
5. POOR APPETITE OR OVEREATING: NOT AT ALL
3. TROUBLE FALLING OR STAYING ASLEEP: NOT AT ALL
9. THOUGHTS THAT YOU WOULD BE BETTER OFF DEAD, OR OF HURTING YOURSELF: NOT AT ALL
1. LITTLE INTEREST OR PLEASURE IN DOING THINGS: NOT AT ALL
SUM OF ALL RESPONSES TO PHQ QUESTIONS 1-9: 0
SUM OF ALL RESPONSES TO PHQ QUESTIONS 1-9: 0
4. FEELING TIRED OR HAVING LITTLE ENERGY: NOT AT ALL
SUM OF ALL RESPONSES TO PHQ QUESTIONS 1-9: 0
6. FEELING BAD ABOUT YOURSELF - OR THAT YOU ARE A FAILURE OR HAVE LET YOURSELF OR YOUR FAMILY DOWN: NOT AT ALL
SUM OF ALL RESPONSES TO PHQ QUESTIONS 1-9: 0